# Patient Record
Sex: FEMALE | Race: WHITE | NOT HISPANIC OR LATINO | Employment: OTHER | URBAN - METROPOLITAN AREA
[De-identification: names, ages, dates, MRNs, and addresses within clinical notes are randomized per-mention and may not be internally consistent; named-entity substitution may affect disease eponyms.]

---

## 2017-01-20 ENCOUNTER — OFFICE VISIT (OUTPATIENT)
Dept: FAMILY MEDICINE CLINIC | Facility: CLINIC | Age: 82
End: 2017-01-20

## 2017-01-20 VITALS
TEMPERATURE: 98.2 F | HEIGHT: 64 IN | SYSTOLIC BLOOD PRESSURE: 120 MMHG | BODY MASS INDEX: 19.5 KG/M2 | DIASTOLIC BLOOD PRESSURE: 52 MMHG | HEART RATE: 68 BPM | OXYGEN SATURATION: 95 % | WEIGHT: 114.2 LBS

## 2017-01-20 DIAGNOSIS — I50.9 CONGESTIVE HEART FAILURE, UNSPECIFIED CONGESTIVE HEART FAILURE CHRONICITY, UNSPECIFIED CONGESTIVE HEART FAILURE TYPE: ICD-10-CM

## 2017-01-20 DIAGNOSIS — E03.8 OTHER SPECIFIED HYPOTHYROIDISM: ICD-10-CM

## 2017-01-20 DIAGNOSIS — E78.49 OTHER HYPERLIPIDEMIA: ICD-10-CM

## 2017-01-20 DIAGNOSIS — I10 HYPERTENSION, ESSENTIAL: Primary | ICD-10-CM

## 2017-01-20 LAB
ALBUMIN SERPL-MCNC: 3.3 G/DL (ref 3.5–5.2)
ALBUMIN/GLOB SERPL: 0.8 G/DL
ALP SERPL-CCNC: 48 U/L (ref 39–117)
ALT SERPL W P-5'-P-CCNC: 17 U/L (ref 1–33)
ANION GAP SERPL CALCULATED.3IONS-SCNC: 12 MMOL/L
AST SERPL-CCNC: 19 U/L (ref 1–32)
BILIRUB SERPL-MCNC: 0.4 MG/DL (ref 0.1–1.2)
BUN BLD-MCNC: 33 MG/DL (ref 8–23)
BUN/CREAT SERPL: 19.5 (ref 7–25)
CALCIUM SPEC-SCNC: 9.1 MG/DL (ref 8.2–9.6)
CHLORIDE SERPL-SCNC: 99 MMOL/L (ref 98–107)
CHOLEST SERPL-MCNC: 100 MG/DL (ref 0–200)
CO2 SERPL-SCNC: 26 MMOL/L (ref 22–29)
CREAT BLD-MCNC: 1.69 MG/DL (ref 0.57–1)
GFR SERPL CREATININE-BSD FRML MDRD: 28 ML/MIN/1.73
GLOBULIN UR ELPH-MCNC: 3.9 GM/DL
GLUCOSE BLD-MCNC: 92 MG/DL (ref 65–99)
HDLC SERPL-MCNC: 44 MG/DL (ref 40–60)
LDLC SERPL CALC-MCNC: 44 MG/DL (ref 0–100)
LDLC/HDLC SERPL: 1 {RATIO}
POTASSIUM BLD-SCNC: 5.1 MMOL/L (ref 3.5–5.2)
PROT SERPL-MCNC: 7.2 G/DL (ref 6–8.5)
SODIUM BLD-SCNC: 137 MMOL/L (ref 136–145)
TRIGL SERPL-MCNC: 61 MG/DL (ref 0–150)
TSH SERPL DL<=0.05 MIU/L-ACNC: 0.37 MIU/ML (ref 0.27–4.2)
VLDLC SERPL-MCNC: 12.2 MG/DL (ref 5–40)

## 2017-01-20 PROCEDURE — 99214 OFFICE O/P EST MOD 30 MIN: CPT | Performed by: INTERNAL MEDICINE

## 2017-01-20 PROCEDURE — 84443 ASSAY THYROID STIM HORMONE: CPT | Performed by: INTERNAL MEDICINE

## 2017-01-20 PROCEDURE — 80061 LIPID PANEL: CPT | Performed by: INTERNAL MEDICINE

## 2017-01-20 PROCEDURE — 80053 COMPREHEN METABOLIC PANEL: CPT | Performed by: INTERNAL MEDICINE

## 2017-01-20 RX ORDER — LORATADINE 10 MG/1
10 TABLET ORAL EVERY 24 HOURS
COMMUNITY
End: 2017-04-17 | Stop reason: DRUGHIGH

## 2017-01-20 RX ORDER — AMIODARONE HYDROCHLORIDE 200 MG/1
200 TABLET ORAL
Status: ON HOLD | COMMUNITY
Start: 2016-12-24 | End: 2018-01-01

## 2017-01-20 RX ORDER — SPIRONOLACTONE 25 MG/1
25 TABLET ORAL
Status: ON HOLD | COMMUNITY
Start: 2017-01-16 | End: 2018-01-01

## 2017-01-20 RX ORDER — PSEUDOEPHEDRINE HCL 30 MG
100 TABLET ORAL
Status: ON HOLD | COMMUNITY
Start: 2014-01-06 | End: 2018-01-01

## 2017-01-20 RX ORDER — DIPHENHYDRAMINE HCL 25 MG
25 CAPSULE ORAL EVERY 6 HOURS PRN
Status: ON HOLD | COMMUNITY
End: 2018-01-01

## 2017-01-20 RX ORDER — FUROSEMIDE 20 MG/1
20 TABLET ORAL
Status: ON HOLD | COMMUNITY
Start: 2016-12-24 | End: 2018-01-01

## 2017-01-20 NOTE — PROGRESS NOTES
Subjective   [place] MICHELLE Robison is a 89 y.o. female.   Follow up on high blood pressure, lipids thyroid recent CHF diagnosis  History of Present Illness   She is doing fairly well breathing is better recently new onset of CHF is been heard in this medical problem.  Regardless the this felt be a related to her chemotherapy.  Denies any significant leg swelling now breathing is better with onset of treatment.  She is on amiodarone was already hypothyroid prior to starting that we'll check those labs today.  Blood pressure cholesterol meds and doing fairly well to daughter's knowledge.  Patient is currently on Eliquis    Review of Systems   Cardiovascular:        CHF new status post hospitalization   All other systems reviewed and are negative.      Objective   Vitals:    01/20/17 1328   BP: 120/52   Pulse: 68   Temp: 98.2 °F (36.8 °C)   SpO2: 95%   Weight: 114 lb 3.2 oz (51.8 kg)     Physical Exam   Constitutional:   Thin, overall alert diminished hearing particularly right ear   Cardiovascular: Normal rate, regular rhythm and normal heart sounds.    Pulmonary/Chest: Effort normal and breath sounds normal.   Abdominal: Soft. Bowel sounds are normal.   Nursing note and vitals reviewed.      No results found for: INR    Procedures    Assessment/Plan   1.  Hypertension controlled plan continue present medicine recheck in one years time    2.  Hyperlipidemia plan await lab recheck in 6 months if labwork satisfactory    3.  Hypothyroidism plan await lab we did discuss with patient and daughter the need for monitoring twice year with amiodarone therapy    4.  CHF clinically controlled at this point in time plan continue present treatment follow-up with us as needed and per patient's cardiologist    DMuch of this encounter note is an electronic transcription/translation of spoken language to printed text.  The electronic translation of spoken language may permit erroneous, or at times, nonsensical words or phrases to be  inadvertently transcribed.  Although I have reviewed the note for such errors, some may still exist.

## 2017-01-20 NOTE — MR AVS SNAPSHOT
Kathryn Robison   1/20/2017 1:20 PM   Office Visit    Dept Phone:  [phone]   Encounter #:  [id number]    Provider:  Jesus Velázquez Jr., MD   Department:  [facility]                Your Full Care Plan              Your Updated Medication List          This list is accurate as of: 1/20/17  2:14 PM.  Always use your most recent med list.                amiodarone 200 MG tablet   Commonly known as:  PACERONE       apixaban 2.5 MG tablet tablet   Commonly known as:  ELIQUIS       BENADRYL 25 mg   Generic drug:  diphenhydrAMINE        MG capsule       furosemide 20 MG tablet   Commonly known as:  LASIX       levothyroxine 112 MCG tablet   Commonly known as:  SYNTHROID   Take 1 tablet by mouth Daily.       loratadine 10 MG tablet   Commonly known as:  CLARITIN       losartan 100 MG tablet   Commonly known as:  COZAAR   Take 1 tablet by mouth daily.       nebivolol 20 MG tablet   Commonly known as:  BYSTOLIC   Take 1 tablet by mouth daily.       OSCAL 500/200 D-3 500-200 MG-UNIT per tablet   Generic drug:  calcium-vitamin D       rosuvastatin 5 MG tablet   Commonly known as:  CRESTOR   Take 1 tablet by mouth daily.       sertraline 25 MG tablet   Commonly known as:  ZOLOFT   TAKE 1 TABLET DAILY       spironolactone 25 MG tablet   Commonly known as:  ALDACTONE               We Performed the Following     Comprehensive Metabolic Panel     Lipid Panel     TSH       You Were Diagnosed With        Codes Comments    Hypertension, essential    -  Primary ICD-10-CM: I10  ICD-9-CM: 401.9     Other hyperlipidemia     ICD-10-CM: E78.4  ICD-9-CM: 272.4     Other specified hypothyroidism     ICD-10-CM: E03.8  ICD-9-CM: 244.8     Congestive heart failure, unspecified congestive heart failure chronicity, unspecified congestive heart failure type     ICD-10-CM: I50.9  ICD-9-CM: 428.0       Instructions     None    Patient Instructions History      Upcoming  "Appointments     Visit Type Date Time Department    OFFICE VISIT 2017  1:20 PM [facility]    LAB 2017 11:00 AM K Glad to Have Yout Signup     [facility] [employer] allows you to send messages to your doctor, view your test results, renew your prescriptions, schedule appointments, and more. To sign up, go to [employer] and click on the Sign Up Now link in the New User? box. Enter your [employer] Activation Code exactly as it appears below along with the last four digits of your Social Security Number and your Date of Birth () to complete the sign-up process. If you do not sign up before the expiration date, you must request a new code.    [employer] Activation Code: [email or web address]  Expires: 2/3/2017  2:14 PM    If you have questions, you can email [email or web address] or call [phone] to talk to our [employer] staff. Remember, [employer] is NOT to be used for urgent needs. For medical emergencies, dial 911.               Other Info from Your Visit           Your Appointments     2017 11:00 AM EST   Lab with LAB [facility]   [facility] (--)    [address] [place] [id number] [phone]              Allergies     Codeine      Penicillins      Sulfa Antibiotics        Reason for Visit     Follow-up hospital      Vital Signs     Blood Pressure Pulse Temperature Height Weight Oxygen Saturation    120/52 (BP Location: Right arm, Patient Position: Sitting, Cuff Size: Adult) 68 98.2 °F (36.8 °C) (Oral) 64\" (162.6 cm) 114 lb 3.2 oz (51.8 kg) 95%    Body Mass Index Smoking Status                19.6 kg/m2 Former Smoker          Problems and Diagnoses Noted     High blood pressure    -  Primary    Other hyperlipidemia        Underactive thyroid        Heart failure          Results         "

## 2017-01-23 RX ORDER — LEVOTHYROXINE SODIUM 112 UG/1
112 TABLET ORAL DAILY
Qty: 90 TABLET | Refills: 1 | Status: SHIPPED | OUTPATIENT
Start: 2017-01-23 | End: 2017-03-16

## 2017-03-16 DIAGNOSIS — E03.9 HYPOTHYROIDISM, UNSPECIFIED TYPE: Primary | ICD-10-CM

## 2017-03-16 RX ORDER — LEVOTHYROXINE SODIUM 0.1 MG/1
100 TABLET ORAL DAILY
Qty: 30 TABLET | Refills: 1 | Status: SHIPPED | OUTPATIENT
Start: 2017-03-16 | End: 2017-05-08 | Stop reason: SDUPTHER

## 2017-03-20 ENCOUNTER — TELEPHONE (OUTPATIENT)
Dept: FAMILY MEDICINE CLINIC | Facility: CLINIC | Age: 82
End: 2017-03-20

## 2017-03-20 ENCOUNTER — OFFICE VISIT (OUTPATIENT)
Dept: FAMILY MEDICINE CLINIC | Facility: CLINIC | Age: 82
End: 2017-03-20

## 2017-03-20 VITALS
SYSTOLIC BLOOD PRESSURE: 110 MMHG | HEART RATE: 49 BPM | HEIGHT: 64 IN | DIASTOLIC BLOOD PRESSURE: 58 MMHG | OXYGEN SATURATION: 98 % | BODY MASS INDEX: 18.44 KG/M2 | WEIGHT: 108 LBS | TEMPERATURE: 97.7 F

## 2017-03-20 DIAGNOSIS — N18.9 CKD (CHRONIC KIDNEY DISEASE), UNSPECIFIED STAGE: ICD-10-CM

## 2017-03-20 DIAGNOSIS — R00.1 BRADYCARDIA: ICD-10-CM

## 2017-03-20 DIAGNOSIS — N30.01 ACUTE CYSTITIS WITH HEMATURIA: Primary | ICD-10-CM

## 2017-03-20 DIAGNOSIS — I10 ESSENTIAL HYPERTENSION: ICD-10-CM

## 2017-03-20 LAB
BACTERIA UR QL AUTO: ABNORMAL /HPF
BILIRUB UR QL STRIP: NEGATIVE
CLARITY UR: CLEAR
COLOR UR: YELLOW
GLUCOSE UR STRIP-MCNC: NEGATIVE MG/DL
HGB UR QL STRIP.AUTO: ABNORMAL
KETONES UR QL STRIP: NEGATIVE
LEUKOCYTE ESTERASE UR QL STRIP.AUTO: ABNORMAL
NITRITE UR QL STRIP: NEGATIVE
PH UR STRIP.AUTO: 6.5 [PH] (ref 4.6–8)
PROT UR QL STRIP: NEGATIVE
RBC # UR: ABNORMAL /HPF
REF LAB TEST METHOD: ABNORMAL
SP GR UR STRIP: 1.01 (ref 1–1.03)
SQUAMOUS #/AREA URNS HPF: ABNORMAL /HPF
UROBILINOGEN UR QL STRIP: ABNORMAL
WBC UR QL AUTO: ABNORMAL /HPF

## 2017-03-20 PROCEDURE — 81001 URINALYSIS AUTO W/SCOPE: CPT | Performed by: NURSE PRACTITIONER

## 2017-03-20 PROCEDURE — 87086 URINE CULTURE/COLONY COUNT: CPT | Performed by: NURSE PRACTITIONER

## 2017-03-20 PROCEDURE — 99213 OFFICE O/P EST LOW 20 MIN: CPT | Performed by: NURSE PRACTITIONER

## 2017-03-20 NOTE — PATIENT INSTRUCTIONS
pending urine culture, cont increase H20 6-8 glasses daily and wipe front to back, urin every couple hours, call or RTC if sx worsen, will FU with Dr Washington regarding bradycardia, cont check BP at home

## 2017-03-20 NOTE — PROGRESS NOTES
Subjective   Kathryn MICHELLE Robison is a 89 y.o. female.     History of Present Illness   C/o dysuria x 5 days now slightly improved, increased H20 and thinks has helped, notes was not drinking much H20 prev, wiping front to back, no fevers, no flank pain, no body aches, no urgency or freq other than normal as on lasix, recently saw Dr Washington cardiology 02/17 with HR 51, today 49 denies syncope or falls at home, no dizziness HA LE edema or CP, Sees oncology Dr Henriquez for lung and ovarian ca, s/p hyst in 1980s but some ovary left, with last cr 1.69, allergic codeine, PCN, sulfa    The following portions of the patient's history were reviewed and updated as appropriate: allergies, current medications, past family history, past medical history, past social history, past surgical history and problem list.    Review of Systems   Constitutional: Negative for fever.   Respiratory: Negative for cough, shortness of breath and wheezing.    Cardiovascular: Negative for chest pain, palpitations and leg swelling.   Genitourinary: Positive for dysuria, frequency and urgency. Negative for decreased urine volume, difficulty urinating, flank pain, genital sores, hematuria, menstrual problem, pelvic pain, vaginal bleeding, vaginal discharge and vaginal pain.   Neurological: Negative for dizziness and headaches.   All other systems reviewed and are negative.      Objective   Physical Exam   Constitutional: She is oriented to person, place, and time. She appears well-developed and well-nourished.   HENT:   Head: Normocephalic and atraumatic.   Eyes: Conjunctivae and EOM are normal. Pupils are equal, round, and reactive to light.   Cardiovascular: Normal rate, regular rhythm and normal heart sounds.    Pulmonary/Chest: Effort normal and breath sounds normal.   Abdominal: There is no tenderness. There is no CVA tenderness.   Musculoskeletal: Normal range of motion.   Neurological: She is alert and oriented to person, place, and time.   Skin:  Skin is warm and dry.   Psychiatric: She has a normal mood and affect. Her behavior is normal. Judgment and thought content normal.   Vitals reviewed.      Assessment/Plan   Kathryn was seen today for urinary tract infection.    Diagnoses and all orders for this visit:    Acute cystitis with hematuria  -     Urinalysis With Microscopic  -     Urinalysis  -     Urinalysis, Microscopic Only  -     Urine Culture    CKD (chronic kidney disease), unspecified stage    Bradycardia    Essential hypertension    pending urine culture, cont increase H20 6-8 glasses daily and wipe front to back, urin every couple hours, call or RTC if sx worsen, will FU with Dr Washington regarding bradycardia, cont check BP at home

## 2017-03-20 NOTE — TELEPHONE ENCOUNTER
PT HAS APPT WITH JAMAR MEJIA TODAY    ----- Message from Shobha Posadas sent at 3/20/2017  9:39 AM EDT -----  Contact: PATIENT [phone]  PATIENT HAS A UTI AND WANTS TO BE SEEN TODAY

## 2017-03-22 ENCOUNTER — TELEPHONE (OUTPATIENT)
Dept: FAMILY MEDICINE CLINIC | Facility: CLINIC | Age: 82
End: 2017-03-22

## 2017-03-22 LAB — BACTERIA SPEC AEROBE CULT: ABNORMAL

## 2017-03-22 NOTE — TELEPHONE ENCOUNTER
Informed pts daughter Zabrina of results    ----- Message from KIET Villalta sent at 3/22/2017  8:57 AM EDT -----  Culture shows very little bacteria should clear on own and doesn't require abx unless she is still having any sx

## 2017-03-23 RX ORDER — LOSARTAN POTASSIUM 25 MG/1
25 TABLET ORAL DAILY
Qty: 90 TABLET | Refills: 3 | Status: ON HOLD | OUTPATIENT
Start: 2017-03-23 | End: 2018-01-01

## 2017-03-23 RX ORDER — ROSUVASTATIN CALCIUM 5 MG/1
5 TABLET, COATED ORAL DAILY
Qty: 90 TABLET | Refills: 3 | Status: ON HOLD | OUTPATIENT
Start: 2017-03-23 | End: 2018-01-01

## 2017-04-17 ENCOUNTER — OFFICE VISIT (OUTPATIENT)
Dept: FAMILY MEDICINE CLINIC | Facility: CLINIC | Age: 82
End: 2017-04-17

## 2017-04-17 VITALS
TEMPERATURE: 98.3 F | OXYGEN SATURATION: 94 % | HEART RATE: 52 BPM | HEIGHT: 63 IN | BODY MASS INDEX: 18.96 KG/M2 | SYSTOLIC BLOOD PRESSURE: 126 MMHG | DIASTOLIC BLOOD PRESSURE: 60 MMHG | WEIGHT: 107 LBS

## 2017-04-17 DIAGNOSIS — J30.2 SEASONAL ALLERGIC RHINITIS, UNSPECIFIED ALLERGIC RHINITIS TRIGGER: ICD-10-CM

## 2017-04-17 DIAGNOSIS — E03.9 HYPOTHYROIDISM, UNSPECIFIED TYPE: ICD-10-CM

## 2017-04-17 DIAGNOSIS — D22.9 CHANGING NEVUS: ICD-10-CM

## 2017-04-17 DIAGNOSIS — J01.00 ACUTE MAXILLARY SINUSITIS, RECURRENCE NOT SPECIFIED: Primary | ICD-10-CM

## 2017-04-17 DIAGNOSIS — R05.9 COUGH: ICD-10-CM

## 2017-04-17 LAB — TSH SERPL DL<=0.05 MIU/L-ACNC: 0.34 MIU/ML (ref 0.27–4.2)

## 2017-04-17 PROCEDURE — 99213 OFFICE O/P EST LOW 20 MIN: CPT | Performed by: NURSE PRACTITIONER

## 2017-04-17 PROCEDURE — 84443 ASSAY THYROID STIM HORMONE: CPT | Performed by: INTERNAL MEDICINE

## 2017-04-17 RX ORDER — AZITHROMYCIN 250 MG/1
TABLET, FILM COATED ORAL
Qty: 6 TABLET | Refills: 0 | Status: SHIPPED | OUTPATIENT
Start: 2017-04-17 | End: 2017-04-28

## 2017-04-17 RX ORDER — CETIRIZINE HYDROCHLORIDE 10 MG/1
5 TABLET ORAL NIGHTLY
Qty: 30 TABLET | Refills: 5 | Status: ON HOLD | OUTPATIENT
Start: 2017-04-17 | End: 2018-01-01

## 2017-04-17 NOTE — PATIENT INSTRUCTIONS
erx zpack use as directed, increase H20 and rest, monitor sx and if persist or worsen RTC, stop claritin and trial zytrec 10 mg 1/2 PO HS, cont OTC cough drops, check labs and call with results, enc FU with Dr Brayden phillip overdue

## 2017-04-17 NOTE — PROGRESS NOTES
Subjective   [place] MICHELLE Robison is a 89 y.o. female.     History of Present Illness   C/o prod cough x 5 days,  No SOA or wheezing, no fevers, no sore throat, some sinus tenderness, no ear pain, no known exposure, no OTC, with allergies on loratadine 10 mg daily doesn't think is helping, allergic codeine, PCN and sulfa, no recent abx  Pt here with eldest daughter Yumiko, pt not good historian, c/o fatigue, also daughter notes hx of skin ca and spot on nose x 2, thinks pt previously saw Dr Owen  With hypothyroid on levothyroxine 100 mcg daily no missed doses    The following portions of the patient's history were reviewed and updated as appropriate: allergies, current medications, past family history, past medical history, past social history, past surgical history and problem list.    Review of Systems   Constitutional: Positive for fatigue. Negative for fever.   HENT: Positive for congestion, facial swelling, hearing loss and sinus pressure. Negative for dental problem, ear discharge, ear pain, mouth sores, nosebleeds, postnasal drip, sore throat, trouble swallowing and voice change.    Respiratory: Positive for cough. Negative for shortness of breath and wheezing.    Cardiovascular: Negative for chest pain, palpitations and leg swelling.   Skin: Positive for rash.   Neurological: Negative for dizziness and headaches.   All other systems reviewed and are negative.      Objective   Physical Exam   Constitutional: She is oriented to person, place, and time. She appears well-developed and well-nourished.   HENT:   Head: Normocephalic and atraumatic.   Right Ear: Tympanic membrane normal. Decreased hearing is noted.   Left Ear: Tympanic membrane normal. Decreased hearing is noted.   Nose: Mucosal edema present. Right sinus exhibits maxillary sinus tenderness. Right sinus exhibits no frontal sinus tenderness. Left sinus exhibits maxillary sinus tenderness. Left sinus exhibits no frontal sinus tenderness.   Mouth/Throat:  No oropharyngeal exudate, posterior oropharyngeal edema or posterior oropharyngeal erythema.   Eyes: Conjunctivae and EOM are normal. Pupils are equal, round, and reactive to light.   Neck: Normal range of motion. Neck supple. No thyromegaly present.   Cardiovascular: Normal rate, regular rhythm and normal heart sounds.    Pulmonary/Chest: Effort normal and breath sounds normal.   Musculoskeletal: Normal range of motion.   Lymphadenopathy:     She has no cervical adenopathy.   Neurological: She is alert and oriented to person, place, and time.   Skin: Skin is warm and dry. Rash (nonhealing ulcer on nose, no excoriation, no satellite lesions) noted.   Psychiatric: She has a normal mood and affect. Her behavior is normal. Judgment and thought content normal.   Vitals reviewed.      Assessment/Plan   Kathryn was seen today for cough.    Diagnoses and all orders for this visit:    Acute maxillary sinusitis, recurrence not specified    Cough    Seasonal allergic rhinitis, unspecified allergic rhinitis trigger    Changing nevus    Hypothyroidism, unspecified type  -     TSH  -     Cancel: TSH    Other orders  -     cetirizine (zyrTEC) 10 MG tablet; Take 0.5 tablets by mouth Every Night.  -     azithromycin (ZITHROMAX Z-DENVER) 250 MG tablet; Take 2 tablets the first day, then 1 tablet daily for 4 days.    erx zpack use as directed, increase H20 and rest, monitor sx and if persist or worsen RTC, stop claritin and trial zytrec 10 mg 1/2 PO HS, cont OTC cough drops, check labs and call with results, enc FU with Dr Brayden prado

## 2017-04-18 ENCOUNTER — TELEPHONE (OUTPATIENT)
Dept: FAMILY MEDICINE CLINIC | Facility: CLINIC | Age: 82
End: 2017-04-18

## 2017-04-18 NOTE — TELEPHONE ENCOUNTER
----- Message from KIET Villalta sent at 4/18/2017  7:56 AM EDT -----  Thyroid lab normal    Pt's daughter informed of lab results

## 2017-04-24 ENCOUNTER — TELEPHONE (OUTPATIENT)
Dept: FAMILY MEDICINE CLINIC | Facility: CLINIC | Age: 82
End: 2017-04-24

## 2017-04-24 ENCOUNTER — OFFICE VISIT (OUTPATIENT)
Dept: FAMILY MEDICINE CLINIC | Facility: CLINIC | Age: 82
End: 2017-04-24

## 2017-04-24 VITALS
BODY MASS INDEX: 18.25 KG/M2 | SYSTOLIC BLOOD PRESSURE: 112 MMHG | HEART RATE: 50 BPM | WEIGHT: 103 LBS | HEIGHT: 63 IN | OXYGEN SATURATION: 99 % | TEMPERATURE: 97.4 F | DIASTOLIC BLOOD PRESSURE: 62 MMHG

## 2017-04-24 DIAGNOSIS — R53.1 WEAKNESS: ICD-10-CM
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
## 2017-04-24 DIAGNOSIS — C56.1 OVARIAN CANCER, RIGHT (HCC): ICD-10-CM

## 2017-04-24 DIAGNOSIS — C34.12 MALIGNANT NEOPLASM OF UPPER LOBE OF LEFT LUNG (HCC): ICD-10-CM

## 2017-04-24 DIAGNOSIS — R63.4 WEIGHT LOSS: ICD-10-CM

## 2017-04-24 DIAGNOSIS — R05.9 COUGH: Primary | ICD-10-CM

## 2017-04-24 LAB
ALBUMIN SERPL-MCNC: 3.8 G/DL (ref 3.5–5.2)
ALBUMIN/GLOB SERPL: 0.9 G/DL
ALP SERPL-CCNC: 54 U/L (ref 39–117)
ALT SERPL W P-5'-P-CCNC: 32 U/L (ref 1–33)
ANION GAP SERPL CALCULATED.3IONS-SCNC: 16.1 MMOL/L
AST SERPL-CCNC: 32 U/L (ref 1–32)
BILIRUB SERPL-MCNC: 0.4 MG/DL (ref 0.1–1.2)
BUN BLD-MCNC: 40 MG/DL (ref 8–23)
BUN/CREAT SERPL: 17.5 (ref 7–25)
CALCIUM SPEC-SCNC: 9.4 MG/DL (ref 8.2–9.6)
CHLORIDE SERPL-SCNC: 97 MMOL/L (ref 98–107)
CO2 SERPL-SCNC: 23.9 MMOL/L (ref 22–29)
CREAT BLD-MCNC: 2.28 MG/DL (ref 0.57–1)
ERYTHROCYTE [DISTWIDTH] IN BLOOD BY AUTOMATED COUNT: 14.4 % (ref 4.5–15)
GFR SERPL CREATININE-BSD FRML MDRD: 20 ML/MIN/1.73
GLOBULIN UR ELPH-MCNC: 4.2 GM/DL
GLUCOSE BLD-MCNC: 104 MG/DL (ref 65–99)
HCT VFR BLD AUTO: 32.7 % (ref 31–42)
HGB BLD-MCNC: 10.5 G/DL (ref 12–18)
LYMPHOCYTES # BLD AUTO: 2 10*3/MM3 (ref 1.2–3.4)
LYMPHOCYTES NFR BLD AUTO: 23.8 % (ref 21–51)
MCH RBC QN AUTO: 30.5 PG (ref 26.1–33.1)
MCHC RBC AUTO-ENTMCNC: 32.1 G/DL (ref 33–37)
MCV RBC AUTO: 95 FL (ref 80–99)
MONOCYTES # BLD AUTO: 0.4 10*3/MM3 (ref 0.1–0.6)
MONOCYTES NFR BLD AUTO: 4.3 % (ref 2–9)
NEUTROPHILS # BLD AUTO: 6.2 10*3/MM3 (ref 1.4–6.5)
NEUTROPHILS NFR BLD AUTO: 71.9 % (ref 42–75)
PLATELET # BLD AUTO: 222 10*3/MM3 (ref 150–450)
PMV BLD AUTO: 6.2 FL (ref 7.1–10.5)
POTASSIUM BLD-SCNC: 4.5 MMOL/L (ref 3.5–5.2)
PROT SERPL-MCNC: 8 G/DL (ref 6–8.5)
RBC # BLD AUTO: 3.44 10*6/MM3 (ref 4–6)
SODIUM BLD-SCNC: 137 MMOL/L (ref 136–145)
WBC NRBC COR # BLD: 8.6 10*3/MM3 (ref 4.5–10)

## 2017-04-24 PROCEDURE — 85025 COMPLETE CBC W/AUTO DIFF WBC: CPT | Performed by: NURSE PRACTITIONER

## 2017-04-24 PROCEDURE — 99213 OFFICE O/P EST LOW 20 MIN: CPT | Performed by: NURSE PRACTITIONER

## 2017-04-24 PROCEDURE — 80053 COMPREHEN METABOLIC PANEL: CPT | Performed by: NURSE PRACTITIONER

## 2017-04-24 NOTE — PATIENT INSTRUCTIONS
CBC shows cont anemia improving, call with results, enc start OTC ensure daily to help with calories, offer PT to help with strengthening pt defer, here today with daughter Yumiko who will contact Dr Henriquez pending apt next mo

## 2017-04-24 NOTE — PROGRESS NOTES
Subjective   [place] MICHELLE Robison is a 89 y.o. female.     History of Present Illness   Here to FU on cough was tx 04/17/17 zpack and states still with chronic cough now prod clear, still taking zyrtec, feels better but still with cough and now with worsening weakness with walking, lives at home alone with 3 daughters who check on her and help prepare meals, seeing Dr Henriquez for R ovarian ca and RYAN lung ca stable on last CXR and CT abd pelvis and chest, pending FU with oncology next mo with progressive elevation of  since 01/17, admits not eating much and poor appetite, has prev seen PT s/p MI and knows exercises but not doing at home, denies dizziness but fell this AM d/t leg weakness and tired, states has fallen at home last 2 days but has not hurt herself, now having to crawl to go around house to prevent falls, last labs [facility] 04/17 CBC hgb 10.3, cr 1.9    The following portions of the patient's history were reviewed and updated as appropriate: allergies, current medications, past family history, past medical history, past social history, past surgical history and problem list.    Review of Systems   Constitutional: Positive for fatigue. Negative for fever.   HENT: Positive for hearing loss. Negative for congestion, drooling, ear discharge, facial swelling, postnasal drip, rhinorrhea, sinus pressure, sore throat and voice change.    Respiratory: Positive for cough. Negative for shortness of breath and wheezing.    Cardiovascular: Negative for chest pain, palpitations and leg swelling.   Musculoskeletal: Negative for arthralgias, back pain, gait problem and joint swelling.   Neurological: Positive for weakness. Negative for dizziness, tremors, seizures, syncope, facial asymmetry, speech difficulty, light-headedness, numbness and headaches.   All other systems reviewed and are negative.      Objective   Physical Exam   Constitutional: She is oriented to person, place, and time. She appears well-developed and  well-nourished.   HENT:   Head: Normocephalic and atraumatic.   Right Ear: Hearing and tympanic membrane normal.   Left Ear: Hearing and tympanic membrane normal.   Nose: Right sinus exhibits no maxillary sinus tenderness and no frontal sinus tenderness. Left sinus exhibits no maxillary sinus tenderness and no frontal sinus tenderness.   Mouth/Throat: No oropharyngeal exudate, posterior oropharyngeal edema or posterior oropharyngeal erythema.   Eyes: Conjunctivae and EOM are normal. Pupils are equal, round, and reactive to light.   Neck: Normal range of motion. Neck supple. No thyromegaly present.   Cardiovascular: Normal rate, regular rhythm and normal heart sounds.    Pulmonary/Chest: Effort normal and breath sounds normal.   Musculoskeletal: Normal range of motion.   Lymphadenopathy:     She has no cervical adenopathy.   Neurological: She is alert and oriented to person, place, and time.   Skin: Skin is warm and dry.   Psychiatric: She has a normal mood and affect. Her behavior is normal. Judgment and thought content normal.   Vitals reviewed.      Assessment/Plan   Kathryn was seen today for follow-up and fall.    Diagnoses and all orders for this visit:    Cough  -     CBC & Differential  -     Comprehensive Metabolic Panel  -     CBC Auto Differential    Weakness  -     CBC & Differential  -     Comprehensive Metabolic Panel  -     CBC Auto Differential    Weight loss  -     CBC & Differential  -     Comprehensive Metabolic Panel  -     CBC Auto Differential    Ovarian cancer, right    Malignant neoplasm of upper lobe of left lung    Fall, initial encounter    CBC shows cont anemia improving, call with results, enc start OTC ensure daily to help with calories, offer PT to help with strengthening pt defer, here today with daughter Yumiko who will contact Dr Henriquez pending apt next mo

## 2017-04-24 NOTE — TELEPHONE ENCOUNTER
Called Zabrina, daughter 5:30 PM, Cr elevated from 1.9 now 2.28, showed labs to Dr Velázquez who agrees worried about DANIKA on CKD and recommend IV fluids tonight. Recommend go to ER tonight for eval. Zabrina verbalized understanding and will take her to [facility] tonight

## 2017-04-25 ENCOUNTER — TELEPHONE (OUTPATIENT)
Dept: FAMILY MEDICINE CLINIC | Facility: CLINIC | Age: 82
End: 2017-04-25

## 2017-04-25 NOTE — TELEPHONE ENCOUNTER
Pt is doing ok. She didn't stay overnight. She has to drink plenty of fluids. She has to be seen in 3 days. Heart rate was in 40's. They got it up to 60. She has a uti, gave antibiotic. She has a follow up appt on fri with you

## 2017-04-25 NOTE — TELEPHONE ENCOUNTER
Please call pt's daughter Zabrina and see how patient is, if they went to [facility] last night and got IV fluids

## 2017-04-28 ENCOUNTER — OFFICE VISIT (OUTPATIENT)
Dept: FAMILY MEDICINE CLINIC | Facility: CLINIC | Age: 82
End: 2017-04-28

## 2017-04-28 VITALS
WEIGHT: 107 LBS | SYSTOLIC BLOOD PRESSURE: 130 MMHG | HEIGHT: 64 IN | OXYGEN SATURATION: 98 % | BODY MASS INDEX: 18.27 KG/M2 | DIASTOLIC BLOOD PRESSURE: 60 MMHG | TEMPERATURE: 97.5 F | HEART RATE: 48 BPM

## 2017-04-28 DIAGNOSIS — N30.01 ACUTE CYSTITIS WITH HEMATURIA: Primary | ICD-10-CM

## 2017-04-28 DIAGNOSIS — C56.1 OVARIAN CANCER, RIGHT (HCC): ICD-10-CM

## 2017-04-28 DIAGNOSIS — C34.32 MALIGNANT NEOPLASM OF LOWER LOBE OF LEFT LUNG (HCC): ICD-10-CM

## 2017-04-28 DIAGNOSIS — I50.32 CHRONIC DIASTOLIC HEART FAILURE (HCC): ICD-10-CM

## 2017-04-28 DIAGNOSIS — R79.89 ELEVATED SERUM CREATININE: ICD-10-CM

## 2017-04-28 PROCEDURE — 99213 OFFICE O/P EST LOW 20 MIN: CPT | Performed by: NURSE PRACTITIONER

## 2017-04-28 RX ORDER — CIPROFLOXACIN 500 MG/1
500 TABLET, FILM COATED ORAL
COMMUNITY
Start: 2017-04-25 | End: 2017-05-02

## 2017-04-28 RX ORDER — VITAMIN A ACETATE, BETA CAROTENE, ASCORBIC ACID, CHOLECALCIFEROL, .ALPHA.-TOCOPHEROL ACETATE, DL-, THIAMINE MONONITRATE, RIBOFLAVIN, NIACINAMIDE, PYRIDOXINE HYDROCHLORIDE, FOLIC ACID, CYANOCOBALAMIN, CALCIUM CARBONATE, FERROUS FUMARATE, ZINC OXIDE, CUPRIC OXIDE 3080; 12; 120; 400; 1; 1.84; 3; 20; 22; 920; 25; 200; 27; 10; 2 [IU]/1; UG/1; MG/1; [IU]/1; MG/1; MG/1; MG/1; MG/1; MG/1; [IU]/1; MG/1; MG/1; MG/1; MG/1; MG/1
1 TABLET, FILM COATED ORAL
COMMUNITY
Start: 2017-04-26 | End: 2017-05-10 | Stop reason: SDUPTHER

## 2017-04-28 NOTE — PATIENT INSTRUCTIONS
reviewed [facility] records, labs, imaging see above HPI, finish cipro 500 Bid take with meals increase H20 wipe front to back urin arianne, RTC next week recheck UA, enc schedule FU with cardiology for eval off spironolactone and lasix as don't want exacerbation of CHF, cont FU with Dr Henriquez oncology, stressed importance of meals for calorie intake to prevent weight loss

## 2017-04-28 NOTE — PROGRESS NOTES
Subjective   [place] MICHELLE Robison is a 89 y.o. female.     History of Present Illness   Here to FU on [facility] went 04/24/17 after OV here showing Cr 2.28 concerned about DANIKA on top of CKD and was instructed to go to hospital for IV fluids, admits not drinking or eating much, went to [facility] and stayed overnight had IV fluids and found to have UTI now on cipro 500 mg BID x 1 wk, no dysuria, flank pain, freq or urgency, went back the next day s/t nausea thinks r/t medication with CXR, CT abd pelvis, CT head shows left lung mass lung ca and R ovarian ca monitoring, enlarging sees Dr Henriquez oncology, with BNP elevated 2000s in ER sees [facility] cardiology Diane was told to stop spironolactone and lasix and has not restarted, no LE edema or abd edema, no SOA wheezing coughing, feeling better than last seen and notes trying to increase food and H20, now on carnation for calorie supplement, here with daughter Zabrina    The following portions of the patient's history were reviewed and updated as appropriate: allergies, current medications, past family history, past medical history, past social history, past surgical history and problem list.    Review of Systems   Constitutional: Negative for diaphoresis, fatigue and fever.   Respiratory: Negative for cough, shortness of breath and wheezing.    Cardiovascular: Negative for chest pain, palpitations and leg swelling.   Endocrine: Negative for polyuria.   Genitourinary: Negative for decreased urine volume, dysuria, flank pain, frequency, hematuria, pelvic pain, urgency, vaginal bleeding, vaginal discharge and vaginal pain.   Neurological: Negative for dizziness, weakness and headaches.   All other systems reviewed and are negative.      Objective   Physical Exam   Constitutional: She is oriented to person, place, and time. She appears well-developed and well-nourished.   HENT:   Head: Normocephalic and atraumatic.   Eyes: Conjunctivae and EOM are normal. Pupils are equal, round,  and reactive to light.   Cardiovascular: Normal rate, regular rhythm and normal heart sounds.    Pulmonary/Chest: Effort normal and breath sounds normal.   Abdominal: She exhibits no distension. There is no tenderness. There is no CVA tenderness.   Musculoskeletal: Normal range of motion. She exhibits no edema (B LE).   Neurological: She is alert and oriented to person, place, and time.   Skin: Skin is warm and dry.   Psychiatric: She has a normal mood and affect. Her behavior is normal. Judgment and thought content normal.   Vitals reviewed.      Assessment/Plan   Kathryn was seen today for follow-up.    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

## 2017-05-03 LAB
BACTERIA UR QL AUTO: NORMAL /HPF
BILIRUB UR QL STRIP: NEGATIVE
CLARITY UR: CLEAR
COLOR UR: YELLOW
GLUCOSE UR STRIP-MCNC: NEGATIVE MG/DL
HGB UR QL STRIP.AUTO: NEGATIVE
KETONES UR QL STRIP: NEGATIVE
LEUKOCYTE ESTERASE UR QL STRIP.AUTO: NEGATIVE
NITRITE UR QL STRIP: NEGATIVE
PH UR STRIP.AUTO: 5.5 [PH] (ref 4.6–8)
PROT UR QL STRIP: NEGATIVE
RBC # UR: NORMAL /HPF
REF LAB TEST METHOD: NORMAL
SP GR UR STRIP: <=1.005 (ref 1–1.03)
SQUAMOUS #/AREA URNS HPF: NORMAL /HPF
UROBILINOGEN UR QL STRIP: NORMAL
WBC UR QL AUTO: NORMAL /HPF

## 2017-05-03 PROCEDURE — 81001 URINALYSIS AUTO W/SCOPE: CPT | Performed by: NURSE PRACTITIONER

## 2017-05-08 RX ORDER — LEVOTHYROXINE SODIUM 100 MCG
100 TABLET ORAL DAILY
Qty: 90 TABLET | Refills: 1 | Status: SHIPPED | OUTPATIENT
Start: 2017-05-08 | End: 2017-05-12 | Stop reason: SDUPTHER

## 2017-05-10 RX ORDER — VITAMIN A ACETATE, BETA CAROTENE, ASCORBIC ACID, CHOLECALCIFEROL, .ALPHA.-TOCOPHEROL ACETATE, DL-, THIAMINE MONONITRATE, RIBOFLAVIN, NIACINAMIDE, PYRIDOXINE HYDROCHLORIDE, FOLIC ACID, CYANOCOBALAMIN, CALCIUM CARBONATE, FERROUS FUMARATE, ZINC OXIDE, CUPRIC OXIDE 3080; 12; 120; 400; 1; 1.84; 3; 20; 22; 920; 25; 200; 27; 10; 2 [IU]/1; UG/1; MG/1; [IU]/1; MG/1; MG/1; MG/1; MG/1; MG/1; [IU]/1; MG/1; MG/1; MG/1; MG/1; MG/1
1 TABLET, FILM COATED ORAL DAILY
Qty: 30 TABLET | Refills: 11 | Status: SHIPPED | OUTPATIENT
Start: 2017-05-10 | End: 2017-06-09

## 2017-05-12 RX ORDER — LEVOTHYROXINE SODIUM 0.1 MG/1
100 TABLET ORAL DAILY
Qty: 90 TABLET | Refills: 1 | Status: SHIPPED | OUTPATIENT
Start: 2017-05-12 | End: 2017-09-14 | Stop reason: DRUGHIGH

## 2017-05-25 RX ORDER — NEBIVOLOL HYDROCHLORIDE 20 MG/1
TABLET ORAL
Qty: 90 TABLET | Refills: 2 | Status: ON HOLD | OUTPATIENT
Start: 2017-05-25 | End: 2018-01-01

## 2017-08-16 ENCOUNTER — TELEPHONE (OUTPATIENT)
Dept: FAMILY MEDICINE CLINIC | Facility: CLINIC | Age: 82
End: 2017-08-16

## 2017-08-16 RX ORDER — PRENATAL WITH FERROUS FUM AND FOLIC ACID 3080; 920; 120; 400; 22; 1.84; 3; 20; 10; 1; 12; 200; 27; 25; 2 [IU]/1; [IU]/1; MG/1; [IU]/1; MG/1; MG/1; MG/1; MG/1; MG/1; MG/1; UG/1; MG/1; MG/1; MG/1; MG/1
1 TABLET ORAL DAILY
Qty: 30 TABLET | Refills: 0 | Status: ON HOLD | OUTPATIENT
Start: 2017-08-16 | End: 2018-01-01

## 2017-09-13 ENCOUNTER — TELEPHONE (OUTPATIENT)
Dept: FAMILY MEDICINE CLINIC | Facility: CLINIC | Age: 82
End: 2017-09-13

## 2017-09-13 DIAGNOSIS — E03.9 HYPOTHYROIDISM, UNSPECIFIED TYPE: Primary | ICD-10-CM

## 2017-09-14 DIAGNOSIS — E03.9 HYPOTHYROIDISM, UNSPECIFIED TYPE: Primary | ICD-10-CM

## 2017-09-14 RX ORDER — LEVOTHYROXINE SODIUM 112 UG/1
112 TABLET ORAL DAILY
Qty: 30 TABLET | Refills: 0
Start: 2017-09-14 | End: 2017-10-27 | Stop reason: DRUGHIGH

## 2017-10-17 ENCOUNTER — TELEPHONE (OUTPATIENT)
Dept: FAMILY MEDICINE CLINIC | Facility: CLINIC | Age: 82
End: 2017-10-17

## 2017-10-17 NOTE — TELEPHONE ENCOUNTER
[facility] SPECIALIST JUST WANTED TO MAKE SURE BTI WAS AWARE OF PT ELEVATED THYROID, WHEN SHE SPOKE TO THE PT ,THE PT ACTED LIKE WE KNEW ABOUT IT BUT SAID HER KIDNEY PROBLEM WAS MORE IMPORTANT AND THAT HER THYROID COULD BE PUT TO THE SIDE FOR NOW.

## 2017-10-26 ENCOUNTER — LAB (OUTPATIENT)
Dept: FAMILY MEDICINE CLINIC | Facility: CLINIC | Age: 82
End: 2017-10-26

## 2017-10-26 DIAGNOSIS — E03.9 HYPOTHYROIDISM, UNSPECIFIED TYPE: ICD-10-CM

## 2017-10-26 LAB — TSH SERPL DL<=0.05 MIU/L-ACNC: 16.77 MIU/ML (ref 0.27–4.2)

## 2017-10-26 PROCEDURE — 36415 COLL VENOUS BLD VENIPUNCTURE: CPT | Performed by: INTERNAL MEDICINE

## 2017-10-26 PROCEDURE — 84443 ASSAY THYROID STIM HORMONE: CPT | Performed by: INTERNAL MEDICINE

## 2017-10-27 DIAGNOSIS — E03.9 HYPOTHYROIDISM, UNSPECIFIED TYPE: Primary | ICD-10-CM

## 2017-10-27 RX ORDER — LEVOTHYROXINE SODIUM 0.12 MG/1
125 TABLET ORAL DAILY
Qty: 90 TABLET | Refills: 1 | Status: SHIPPED | OUTPATIENT
Start: 2017-10-27 | End: 2018-01-01 | Stop reason: SDUPTHER

## 2017-10-27 RX ORDER — LEVOTHYROXINE SODIUM 0.12 MG/1
125 TABLET ORAL DAILY
Qty: 30 TABLET | Refills: 2 | Status: SHIPPED | OUTPATIENT
Start: 2017-10-27 | End: 2017-10-27 | Stop reason: SDUPTHER

## 2017-10-27 RX ORDER — LEVOTHYROXINE SODIUM 0.12 MG/1
125 TABLET ORAL DAILY
Qty: 90 TABLET | Refills: 1 | Status: SHIPPED | OUTPATIENT
Start: 2017-10-27 | End: 2017-10-27 | Stop reason: SDUPTHER

## 2017-10-27 NOTE — TELEPHONE ENCOUNTER
Still needs to be addressed if [id number]  increaseto 125  Probably effect of amiodarone  tsh 6wk

## 2017-10-30 ENCOUNTER — TELEPHONE (OUTPATIENT)
Dept: FAMILY MEDICINE CLINIC | Facility: CLINIC | Age: 82
End: 2017-10-30

## 2017-10-30 NOTE — TELEPHONE ENCOUNTER
----- Message from Jesus Velázquez Jr., MD sent at 10/26/2017  9:24 PM EDT -----  If on thyroid 112  Increase to 125 w tsh 6wk  Make sure takes on empty stomach    Pt's daughter informed of lab results

## 2017-11-03 ENCOUNTER — TELEPHONE (OUTPATIENT)
Dept: FAMILY MEDICINE CLINIC | Facility: CLINIC | Age: 82
End: 2017-11-03

## 2017-11-10 ENCOUNTER — OFFICE VISIT (OUTPATIENT)
Dept: FAMILY MEDICINE CLINIC | Facility: CLINIC | Age: 82
End: 2017-11-10

## 2017-11-10 VITALS
HEIGHT: 64 IN | HEART RATE: 66 BPM | BODY MASS INDEX: 18.85 KG/M2 | DIASTOLIC BLOOD PRESSURE: 66 MMHG | OXYGEN SATURATION: 95 % | TEMPERATURE: 98 F | SYSTOLIC BLOOD PRESSURE: 118 MMHG | WEIGHT: 110.4 LBS

## 2017-11-10 DIAGNOSIS — Z48.02 VISIT FOR SUTURE REMOVAL: ICD-10-CM

## 2017-11-10 DIAGNOSIS — S01.01XD LACERATION OF SCALP, SUBSEQUENT ENCOUNTER: ICD-10-CM
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
## 2017-11-10 DIAGNOSIS — Z00.00 MEDICARE ANNUAL WELLNESS VISIT, INITIAL: Primary | ICD-10-CM

## 2017-11-10 DIAGNOSIS — S51.801D OPEN WOUND OF RIGHT FOREARM, SUBSEQUENT ENCOUNTER: ICD-10-CM

## 2017-11-10 PROBLEM — E78.5 HYPERLIPIDEMIA LDL GOAL <70: Status: ACTIVE | Noted: 2017-11-10

## 2017-11-10 PROBLEM — E03.9 HYPOTHYROIDISM: Status: ACTIVE | Noted: 2017-11-10

## 2017-11-10 PROBLEM — N13.30 HYDROURETERONEPHROSIS: Status: ACTIVE | Noted: 2017-10-13

## 2017-11-10 PROBLEM — I34.0 MITRAL REGURGITATION: Status: ACTIVE | Noted: 2017-11-10

## 2017-11-10 PROBLEM — I48.0 PAROXYSMAL ATRIAL FIBRILLATION (HCC): Status: ACTIVE | Noted: 2017-11-10

## 2017-11-10 PROBLEM — N82.0 VESICOVAGINAL FISTULA: Status: ACTIVE | Noted: 2017-10-13

## 2017-11-10 PROBLEM — C48.2 PERITONEAL CARCINOMA (HCC): Status: ACTIVE | Noted: 2017-09-20

## 2017-11-10 PROBLEM — Z86.718 HISTORY OF DEEP VEIN THROMBOSIS (DVT) OF LOWER EXTREMITY: Status: ACTIVE | Noted: 2017-11-10

## 2017-11-10 PROCEDURE — 99213 OFFICE O/P EST LOW 20 MIN: CPT | Performed by: NURSE PRACTITIONER

## 2017-11-10 PROCEDURE — G0438 PPPS, INITIAL VISIT: HCPCS | Performed by: NURSE PRACTITIONER

## 2017-11-10 NOTE — PROGRESS NOTES
QUICK REFERENCE INFORMATION:  The ABCs of the Annual Wellness Visit    Initial Medicare Wellness Visit    HEALTH RISK ASSESSMENT    11/14/1926    Recent Hospitalizations:  Recently treated at the following:  Other: [facility] 11/17 head injury, 10/17 UTI.    Current Medical Providers:  Patient Care Team:  Jesus Velázquez Jr., MD as PCP - General (Internal Medicine)  Prabhjot Henriquez MD as Consulting Physician (Hematology and Oncology)    Smoking Status:  History   Smoking Status   • Former Smoker   • Quit date: 1977   Smokeless Tobacco   • Not on file       Alcohol Consumption:  History   Alcohol Use No       Depression Screen:   PHQ-2/PHQ-9 Depression Screening 11/10/2017   Little interest or pleasure in doing things 0   Feeling down, depressed, or hopeless 0   Trouble falling or staying asleep, or sleeping too much 0   Feeling tired or having little energy 0   Poor appetite or overeating 1   Feeling bad about yourself - or that you are a failure or have let yourself or your family down 0   Trouble concentrating on things, such as reading the newspaper or watching television 0   Moving or speaking so slowly that other people could have noticed. Or the opposite - being so fidgety or restless that you have been moving around a lot more than usual 0   Thoughts that you would be better off dead, or of hurting yourself in some way 0   Total Score 1   If you checked off any problems, how difficult have these problems made it for you to do your work, take care of things at home, or get along with other people? Not difficult at all       Health Habits and Functional and Cognitive Screening:  Functional & Cognitive Status 11/10/2017   Do you have difficulty preparing food and eating? Yes   Do you have difficulty bathing yourself, getting dressed or grooming yourself? Yes   Do you have difficulty using the toilet? Yes   Do you have difficulty moving around from place to place? Yes   Do you have trouble with steps or getting  out of a bed or a chair? Yes   In the past year have you fallen or experienced a near fall? Yes   Current Diet Well Balanced Diet   Dental Exam Up to date   Eye Exam Not up to date   Exercise (times per week) 0 times per week   Current Exercise Activities Include None   Do you need help using the phone?  No   Are you deaf or do you have serious difficulty hearing?  Yes   Do you need help with transportation? Yes   Do you need help shopping? Yes   Do you need help preparing meals?  Yes   Do you need help with housework?  Yes   Do you need help with laundry? Yes   Do you need help taking your medications? Yes   Do you need help managing money? No   Have you felt unusual stress, anger or loneliness in the last month? Yes   Who do you live with? Child   If you need help, do you have trouble finding someone available to you? No   Have you been bothered in the last four weeks by sexual problems? No   Do you have difficulty concentrating, remembering or making decisions? No   daughters are rotating living with her    Does the patient have evidence of cognitive impairment? No    Asiprin use counseling: Does not need ASA (and currently is not on it)      Recent Lab Results:reviewed labs [place] 10/17 and 04/17    Visual Acuity:  No exam data present    Age-appropriate Screening Schedule:  Refer to the list below for future screening recommendations based on patient's age, sex and/or medical conditions. Orders for these recommended tests are listed in the plan section. The patient has been provided with a written plan.    Health Maintenance   Topic Date Due   • TDAP/TD VACCINES (1 - Tdap) 11/14/1945   • ZOSTER VACCINE  04/02/2016   • MAMMOGRAM  04/07/2016   • PNEUMOCOCCAL VACCINES (65+ LOW/MEDIUM RISK) (2 of 2 - PPSV23) 10/03/2017   • LIPID PANEL  01/20/2018   • INFLUENZA VACCINE  Completed        Subjective   History of Present Illness    Kathryn Robison is a 89 y.o. female who presents for an Annual Wellness Visit.    The  following portions of the patient's history were reviewed and updated as appropriate: allergies, current medications, past family history, past medical history, past social history, past surgical history and problem list.    Outpatient Medications Prior to Visit   Medication Sig Dispense Refill   • amiodarone (PACERONE) 200 MG tablet Take 200 mg by mouth.     • apixaban (ELIQUIS) 2.5 MG tablet tablet Take 2.5 mg by mouth.     • BYSTOLIC 20 MG tablet TAKE 1 TABLET DAILY 90 tablet 2   • calcium-vitamin D (OSCAL 500/200 D-3) 500-200 MG-UNIT per tablet Take 1 tablet by mouth daily.     • cetirizine (zyrTEC) 10 MG tablet Take 0.5 tablets by mouth Every Night. 30 tablet 5   • diphenhydrAMINE (BENADRYL) 25 mg Take 25 mg by mouth Every 6 (Six) Hours As Needed for itching.     • Docusate Sodium (DSS) 100 MG capsule Take 100 mg by mouth.     • furosemide (LASIX) 20 MG tablet Take 20 mg by mouth.     • levothyroxine (SYNTHROID) 125 MCG tablet Take 1 tablet by mouth Daily. 90 tablet 1   • losartan (COZAAR) 25 MG tablet Take 1 tablet by mouth Daily. 90 tablet 3   • Prenatal Vit-Fe Fumarate-FA (PRENATAL 27-1) 27-1 MG tablet tablet Take 1 tablet by mouth Daily. 30 tablet 0   • rosuvastatin (CRESTOR) 5 MG tablet Take 1 tablet by mouth Daily. 90 tablet 3   • sertraline (ZOLOFT) 25 MG tablet TAKE 1 TABLET DAILY 90 tablet 3   • spironolactone (ALDACTONE) 25 MG tablet Take 25 mg by mouth.       No facility-administered medications prior to visit.        Patient Active Problem List   Diagnosis   • Hypertension   • Ovarian cancer   • Lung cancer   • Chronic diastolic heart failure   • Elevated CA-125   • Vesicovaginal fistula   • Primary cancer of left upper lobe of lung   • Peritoneal carcinoma   • Paroxysmal atrial fibrillation   • Mitral regurgitation   • Mild pulmonary hypertension   • Metastatic cancer   • History of deep vein thrombosis (DVT) of lower extremity   • History of non-ST elevation myocardial infarction (NSTEMI)   •  "Hydroureteronephrosis   • Hypothyroidism   • Hyperlipidemia LDL goal <70       Advance Care Planning:  has an advance directive - a copy HAS NOT been provided    Identification of Risk Factors:  Risk factors include: cardiovascular risk, increased fall risk and hearing limitations.    Review of Systems    Compared to one year ago, the patient feels her physical health is worse.  Compared to one year ago, the patient feels her mental health is the same.    Objective     Physical Exam    Vitals:    11/10/17 1128   BP: 118/66   Pulse: 66   Temp: 98 °F (36.7 °C)   SpO2: 95%   Weight: 110 lb 6.4 oz (50.1 kg)   Height: 64\" (162.6 cm)   PainSc: 0-No pain       Body mass index is 18.95 kg/(m^2).  Discussed the patient's BMI with her. The BMI is below average; BMI management plan is completed.    Assessment/Plan   Patient Self-Management and Personalized Health Advice  The patient has been provided with information about: weight management, prevention of cardiac or vascular disease and fall prevention and preventive services including:   · Fall Risk assessment done cancer has since returned, doesn't want any intervention, already had flu shot this year, tdap ER 11/17, prevnar 10/16.    Visit Diagnoses:    ICD-10-CM ICD-9-CM   1. Medicare annual wellness visit, initial Z00.00 V70.0   2. Fall, subsequent encounter W19.XXXD V58.89     E888.9   3. Laceration of scalp, subsequent encounter S01.01XD V58.89     873.0   4. Open wound of right forearm, subsequent encounter S51.801D V58.89     881.00   5. Visit for suture removal Z48.02 V58.32       No orders of the defined types were placed in this encounter.      Outpatient Encounter Prescriptions as of 11/10/2017   Medication Sig Dispense Refill   • amiodarone (PACERONE) 200 MG tablet Take 200 mg by mouth.     • apixaban (ELIQUIS) 2.5 MG tablet tablet Take 2.5 mg by mouth.     • BYSTOLIC 20 MG tablet TAKE 1 TABLET DAILY 90 tablet 2   • calcium-vitamin D (OSCAL 500/200 D-3) 500-200 " MG-UNIT per tablet Take 1 tablet by mouth daily.     • cetirizine (zyrTEC) 10 MG tablet Take 0.5 tablets by mouth Every Night. 30 tablet 5   • diphenhydrAMINE (BENADRYL) 25 mg Take 25 mg by mouth Every 6 (Six) Hours As Needed for itching.     • Docusate Sodium (DSS) 100 MG capsule Take 100 mg by mouth.     • furosemide (LASIX) 20 MG tablet Take 20 mg by mouth.     • levothyroxine (SYNTHROID) 125 MCG tablet Take 1 tablet by mouth Daily. 90 tablet 1   • losartan (COZAAR) 25 MG tablet Take 1 tablet by mouth Daily. 90 tablet 3   • Prenatal Vit-Fe Fumarate-FA (PRENATAL 27-1) 27-1 MG tablet tablet Take 1 tablet by mouth Daily. 30 tablet 0   • rosuvastatin (CRESTOR) 5 MG tablet Take 1 tablet by mouth Daily. 90 tablet 3   • sertraline (ZOLOFT) 25 MG tablet TAKE 1 TABLET DAILY 90 tablet 3   • spironolactone (ALDACTONE) 25 MG tablet Take 25 mg by mouth.       No facility-administered encounter medications on file as of 11/10/2017.        Reviewed use of high risk medication in the elderly: yes  Reviewed for potential of harmful drug interactions in the elderly: yes    Follow Up:  No Follow-up on file.     An After Visit Summary and PPPS with all of these plans were given to the patient.

## 2017-11-10 NOTE — PROGRESS NOTES
Subjective   [place] MICHELLE Robison is a 89 y.o. female.     History of Present Illness   Here to  on recent fall at home, tried to sit on chair and missed hit head with laceration over R eyebrow and R cheek and hit R forearm, daughter was on a walk and when returned found her on ground and transported to [facility] 11/03/17 with 5 sutures places and imaging xray shoulder, wrist, elbow swelling no fracture, CT facial, spine, head NAD, now here with daughter for removal, no falls since hospital, with some weakness prolonged walking, no longer cook or bathing or dressing alone and some irritability at decline in function, mood stable on zoloft denies depression, no confusion, no HA dizziness, NV, blurry vision, CP dizziness HA but LE edema no longer seeing cardiology or taking lasix with CHF but recently found out cancer had returned and decided doesn't any intervention with last Cr 2 04/17, following low sodium diet, with CAD, CHF, and HTN on losartan 25 mg, bystolic 20 mg, amiodarone 200 mg, and eliquis no palpations but cont bleeding from forearm wound and healing slowly,last TSH abnl 2 wks ago on levothyroxine 125 mcg daily    The following portions of the patient's history were reviewed and updated as appropriate: allergies, current medications, past family history, past medical history, past social history, past surgical history and problem list.    Review of Systems   Constitutional: Negative for fever.   HENT: Negative for trouble swallowing and voice change.    Respiratory: Negative for cough, shortness of breath and wheezing.    Cardiovascular: Positive for leg swelling. Negative for chest pain and palpitations.   Gastrointestinal: Negative for abdominal distention, abdominal pain and anal bleeding.   Endocrine: Negative for cold intolerance, heat intolerance, polydipsia, polyphagia and polyuria.   Musculoskeletal: Positive for arthralgias and gait problem. Negative for back pain, joint swelling, myalgias, neck  pain and neck stiffness.   Skin: Positive for color change and wound.   Neurological: Negative for dizziness and headaches.   Psychiatric/Behavioral: Positive for agitation. Negative for behavioral problems, confusion, decreased concentration, dysphoric mood, hallucinations, self-injury, sleep disturbance and suicidal ideas. The patient is not nervous/anxious and is not hyperactive.    All other systems reviewed and are negative.      Objective   Physical Exam   Constitutional: She is oriented to person, place, and time. She appears well-developed and well-nourished.   HENT:   Head: Normocephalic and atraumatic.   Eyes: Conjunctivae and EOM are normal. Pupils are equal, round, and reactive to light.   Cardiovascular: Normal rate, regular rhythm and normal heart sounds.    Pulmonary/Chest: Effort normal and breath sounds normal.   Musculoskeletal: Normal range of motion. She exhibits edema (B LE pitting).   Gait stiff and unsteady, walking with daughter   Neurological: She is alert and oriented to person, place, and time.   Skin: Skin is warm and dry. There is erythema (well approximated linear wound healing R superior eyebrow with 5 sutures intact, no active bleeding,  with ecchymosis inferior R eye and some tenderness).   R forearm ulcer with skin thinning and minimal bleeding   Psychiatric: She has a normal mood and affect. Her behavior is normal. Judgment and thought content normal.   Vitals reviewed.  Suture Removal  Date/Time: 11/10/2017 1:18 PM  Performed by: JAMAR MARC  Authorized by: JAMAR MARC   Consent: Verbal consent obtained.  Risks and benefits: risks, benefits and alternatives were discussed  Consent given by: patient  Patient understanding: patient states understanding of the procedure being performed  Patient consent: the patient's understanding of the procedure matches consent given  Procedure consent: procedure consent matches procedure scheduled  Relevant documents:  relevant documents present and verified  Test results: test results available and properly labeled  Imaging studies: imaging studies available  Patient identity confirmed: verbally with patient  Body area: head/neck  Location details: right eyebrow  Wound Appearance: clean  Sutures Removed: 5  Post-removal: antibiotic ointment applied  Patient tolerance: Patient tolerated the procedure well with no immediate complications        Assessment/Plan   Kathryn was seen today for suture / staple removal and wound lt arm check.    Diagnoses and all orders for this visit:    Medicare annual wellness visit, initial    Fall, subsequent encounter    Laceration of scalp, subsequent encounter    Open wound of right forearm, subsequent encounter    Visit for suture removal    Other orders  -     Suture Removal    medicare wellness today, reviewed hospital records, imaging, and labs, 5 sutures removed after cleaned with alcohol pad without complication and abx ointment applied, R forearm abx ointment applied and gauze dressing applied, RTC if sx persist or worsen, Current outpatient and discharge medications have been reconciled for the patient.  Kassandra Gillespie, APRN

## 2017-11-10 NOTE — PATIENT INSTRUCTIONS
medicare wellness today, reviewed hospital records, imaging, and labs, 5 sutures removed after cleaned with alcohol pad without complication and abx ointment applied, R forearm abx ointment applied and gauze dressing applied, RTC if sx persist or worsen, Current outpatient and discharge medications have been reconciled for the patient.  Kassandra Gillespie, APRN

## 2017-11-14 ENCOUNTER — TELEPHONE (OUTPATIENT)
Dept: FAMILY MEDICINE CLINIC | Facility: CLINIC | Age: 82
End: 2017-11-14

## 2017-11-14 NOTE — TELEPHONE ENCOUNTER
PATIENT NEEDS THE TUBE THAT IS ATTACHED TO HER URINE BAG NEEDS TO BE CHANGED. PATIENT NEEDS A REFERRAL FOR SOMEONE TO COME TO HER HOME AND BE ABLE TO CHANGE THIS TUBE

## 2017-11-14 NOTE — TELEPHONE ENCOUNTER
Dr. Vela put this in and they should call them to order home health assistance if a problem.[facility]

## 2017-11-27 ENCOUNTER — TELEPHONE (OUTPATIENT)
Dept: FAMILY MEDICINE CLINIC | Facility: CLINIC | Age: 82
End: 2017-11-27

## 2017-11-27 RX ORDER — SERTRALINE HYDROCHLORIDE 25 MG/1
TABLET, FILM COATED ORAL
Qty: 90 TABLET | Refills: 3 | Status: SHIPPED | OUTPATIENT
Start: 2017-11-27

## 2018-01-01 ENCOUNTER — TELEPHONE (OUTPATIENT)
Dept: FAMILY MEDICINE CLINIC | Facility: CLINIC | Age: 83
End: 2018-01-01

## 2018-01-01 ENCOUNTER — APPOINTMENT (OUTPATIENT)
Dept: CT IMAGING | Facility: HOSPITAL | Age: 83
End: 2018-01-01

## 2018-01-01 ENCOUNTER — ANESTHESIA EVENT (OUTPATIENT)
Dept: PERIOP | Facility: HOSPITAL | Age: 83
End: 2018-01-01

## 2018-01-01 ENCOUNTER — HOSPITAL ENCOUNTER (INPATIENT)
Facility: HOSPITAL | Age: 83
LOS: 4 days | Discharge: NURSING, REHAB OR LONG-TERM CARE | End: 2018-11-17
Attending: EMERGENCY MEDICINE | Admitting: ORTHOPAEDIC SURGERY

## 2018-01-01 ENCOUNTER — APPOINTMENT (OUTPATIENT)
Dept: GENERAL RADIOLOGY | Facility: HOSPITAL | Age: 83
End: 2018-01-01

## 2018-01-01 ENCOUNTER — APPOINTMENT (OUTPATIENT)
Dept: GENERAL RADIOLOGY | Facility: HOSPITAL | Age: 83
End: 2018-01-01
Attending: ORTHOPAEDIC SURGERY

## 2018-01-01 ENCOUNTER — ANESTHESIA (OUTPATIENT)
Dept: PERIOP | Facility: HOSPITAL | Age: 83
End: 2018-01-01

## 2018-01-01 VITALS
BODY MASS INDEX: 16.83 KG/M2 | DIASTOLIC BLOOD PRESSURE: 53 MMHG | SYSTOLIC BLOOD PRESSURE: 117 MMHG | OXYGEN SATURATION: 98 % | RESPIRATION RATE: 18 BRPM | HEART RATE: 88 BPM | HEIGHT: 63 IN | WEIGHT: 95 LBS | TEMPERATURE: 99 F

## 2018-01-01 DIAGNOSIS — S09.90XA CLOSED HEAD INJURY, INITIAL ENCOUNTER: ICD-10-CM
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
## 2018-01-01 DIAGNOSIS — S72.145A CLOSED NONDISPLACED INTERTROCHANTERIC FRACTURE OF LEFT FEMUR, INITIAL ENCOUNTER (HCC): Primary | ICD-10-CM

## 2018-01-01 LAB
ABO + RH BLD: NORMAL
ABO + RH BLD: NORMAL
ABO GROUP BLD: NORMAL
ABO GROUP BLD: NORMAL
ANION GAP SERPL CALCULATED.3IONS-SCNC: 10.5 MMOL/L
ANION GAP SERPL CALCULATED.3IONS-SCNC: 10.7 MMOL/L
ANION GAP SERPL CALCULATED.3IONS-SCNC: 7.7 MMOL/L
ANTI-D: NORMAL
BASOPHILS # BLD AUTO: 0.01 10*3/MM3 (ref 0–0.2)
BASOPHILS NFR BLD AUTO: 0.1 % (ref 0–1.5)
BH BB BLOOD EXPIRATION DATE: NORMAL
BH BB BLOOD EXPIRATION DATE: NORMAL
BH BB BLOOD TYPE BARCODE: 600
BH BB BLOOD TYPE BARCODE: 600
BH BB DISPENSE STATUS: NORMAL
BH BB DISPENSE STATUS: NORMAL
BH BB PRODUCT CODE: NORMAL
BH BB PRODUCT CODE: NORMAL
BH BB UNIT NUMBER: NORMAL
BH BB UNIT NUMBER: NORMAL
BLD GP AB SCN SERPL QL: POSITIVE
BUN BLD-MCNC: 26 MG/DL (ref 8–23)
BUN BLD-MCNC: 28 MG/DL (ref 8–23)
BUN BLD-MCNC: 30 MG/DL (ref 8–23)
BUN/CREAT SERPL: 24.5 (ref 7–25)
BUN/CREAT SERPL: 25.4 (ref 7–25)
BUN/CREAT SERPL: 26.7 (ref 7–25)
CALCIUM SPEC-SCNC: 8.4 MG/DL (ref 8.2–9.6)
CALCIUM SPEC-SCNC: 8.4 MG/DL (ref 8.2–9.6)
CALCIUM SPEC-SCNC: 8.5 MG/DL (ref 8.2–9.6)
CHLORIDE SERPL-SCNC: 97 MMOL/L (ref 98–107)
CHLORIDE SERPL-SCNC: 97 MMOL/L (ref 98–107)
CHLORIDE SERPL-SCNC: 98 MMOL/L (ref 98–107)
CO2 SERPL-SCNC: 25.3 MMOL/L (ref 22–29)
CO2 SERPL-SCNC: 25.5 MMOL/L (ref 22–29)
CO2 SERPL-SCNC: 26.3 MMOL/L (ref 22–29)
CREAT BLD-MCNC: 1.05 MG/DL (ref 0.57–1)
CREAT BLD-MCNC: 1.06 MG/DL (ref 0.57–1)
CREAT BLD-MCNC: 1.18 MG/DL (ref 0.57–1)
CROSSMATCH INTERPRETATION: NORMAL
CROSSMATCH INTERPRETATION: NORMAL
CYTO UR: NORMAL
DEPRECATED RDW RBC AUTO: 52.3 FL (ref 37–54)
DEPRECATED RDW RBC AUTO: 53.4 FL (ref 37–54)
DEPRECATED RDW RBC AUTO: 53.7 FL (ref 37–54)
DEPRECATED RDW RBC AUTO: 53.8 FL (ref 37–54)
EOSINOPHIL # BLD AUTO: 0.01 10*3/MM3 (ref 0–0.7)
EOSINOPHIL NFR BLD AUTO: 0.1 % (ref 0.3–6.2)
ERYTHROCYTE [DISTWIDTH] IN BLOOD BY AUTOMATED COUNT: 14.7 % (ref 11.7–13)
ERYTHROCYTE [DISTWIDTH] IN BLOOD BY AUTOMATED COUNT: 14.9 % (ref 11.7–13)
GFR SERPL CREATININE-BSD FRML MDRD: 43 ML/MIN/1.73
GFR SERPL CREATININE-BSD FRML MDRD: 49 ML/MIN/1.73
GFR SERPL CREATININE-BSD FRML MDRD: 49 ML/MIN/1.73
GLUCOSE BLD-MCNC: 103 MG/DL (ref 65–99)
GLUCOSE BLD-MCNC: 123 MG/DL (ref 65–99)
GLUCOSE BLD-MCNC: 123 MG/DL (ref 65–99)
HCT VFR BLD AUTO: 25.4 % (ref 35.6–45.5)
HCT VFR BLD AUTO: 26.7 % (ref 35.6–45.5)
HCT VFR BLD AUTO: 29.1 % (ref 35.6–45.5)
HCT VFR BLD AUTO: 31.7 % (ref 35.6–45.5)
HGB BLD-MCNC: 8 G/DL (ref 11.9–15.5)
HGB BLD-MCNC: 8.4 G/DL (ref 11.9–15.5)
HGB BLD-MCNC: 9.1 G/DL (ref 11.9–15.5)
HGB BLD-MCNC: 9.7 G/DL (ref 11.9–15.5)
HOLD SPECIMEN: NORMAL
IMM GRANULOCYTES # BLD: 0.11 10*3/MM3 (ref 0–0.03)
IMM GRANULOCYTES NFR BLD: 0.6 % (ref 0–0.5)
LAB AP CASE REPORT: NORMAL
LYMPHOCYTES # BLD AUTO: 1.1 10*3/MM3 (ref 0.9–4.8)
LYMPHOCYTES NFR BLD AUTO: 5.6 % (ref 19.6–45.3)
MCH RBC QN AUTO: 30.1 PG (ref 26.9–32)
MCH RBC QN AUTO: 30.3 PG (ref 26.9–32)
MCH RBC QN AUTO: 30.7 PG (ref 26.9–32)
MCH RBC QN AUTO: 30.9 PG (ref 26.9–32)
MCHC RBC AUTO-ENTMCNC: 30.6 G/DL (ref 32.4–36.3)
MCHC RBC AUTO-ENTMCNC: 31.3 G/DL (ref 32.4–36.3)
MCHC RBC AUTO-ENTMCNC: 31.5 G/DL (ref 32.4–36.3)
MCHC RBC AUTO-ENTMCNC: 31.5 G/DL (ref 32.4–36.3)
MCV RBC AUTO: 96.4 FL (ref 80.5–98.2)
MCV RBC AUTO: 98.1 FL (ref 80.5–98.2)
MCV RBC AUTO: 98.3 FL (ref 80.5–98.2)
MCV RBC AUTO: 98.4 FL (ref 80.5–98.2)
MONOCYTES # BLD AUTO: 0.76 10*3/MM3 (ref 0.2–1.2)
MONOCYTES NFR BLD AUTO: 3.9 % (ref 5–12)
NEUTROPHILS # BLD AUTO: 17.62 10*3/MM3 (ref 1.9–8.1)
NEUTROPHILS NFR BLD AUTO: 89.7 % (ref 42.7–76)
PATH REPORT.FINAL DX SPEC: NORMAL
PATH REPORT.GROSS SPEC: NORMAL
PLATELET # BLD AUTO: 158 10*3/MM3 (ref 140–500)
PLATELET # BLD AUTO: 163 10*3/MM3 (ref 140–500)
PLATELET # BLD AUTO: 175 10*3/MM3 (ref 140–500)
PLATELET # BLD AUTO: 209 10*3/MM3 (ref 140–500)
PMV BLD AUTO: 8.1 FL (ref 6–12)
PMV BLD AUTO: 8.2 FL (ref 6–12)
PMV BLD AUTO: 8.2 FL (ref 6–12)
PMV BLD AUTO: 8.3 FL (ref 6–12)
POTASSIUM BLD-SCNC: 4.2 MMOL/L (ref 3.5–5.2)
POTASSIUM BLD-SCNC: 4.6 MMOL/L (ref 3.5–5.2)
POTASSIUM BLD-SCNC: 4.6 MMOL/L (ref 3.5–5.2)
RBC # BLD AUTO: 2.59 10*6/MM3 (ref 3.9–5.2)
RBC # BLD AUTO: 2.77 10*6/MM3 (ref 3.9–5.2)
RBC # BLD AUTO: 2.96 10*6/MM3 (ref 3.9–5.2)
RBC # BLD AUTO: 3.22 10*6/MM3 (ref 3.9–5.2)
RH BLD: NEGATIVE
RH BLD: NEGATIVE
SODIUM BLD-SCNC: 132 MMOL/L (ref 136–145)
SODIUM BLD-SCNC: 133 MMOL/L (ref 136–145)
SODIUM BLD-SCNC: 133 MMOL/L (ref 136–145)
T&S EXPIRATION DATE: NORMAL
UNIT  ABO: NORMAL
UNIT  ABO: NORMAL
UNIT  RH: NORMAL
UNIT  RH: NORMAL
WBC NRBC COR # BLD: 16.07 10*3/MM3 (ref 4.5–10.7)
WBC NRBC COR # BLD: 16.45 10*3/MM3 (ref 4.5–10.7)
WBC NRBC COR # BLD: 18.89 10*3/MM3 (ref 4.5–10.7)
WBC NRBC COR # BLD: 19.61 10*3/MM3 (ref 4.5–10.7)
WHOLE BLOOD HOLD SPECIMEN: NORMAL

## 2018-01-01 PROCEDURE — 80048 BASIC METABOLIC PNL TOTAL CA: CPT | Performed by: HOSPITALIST

## 2018-01-01 PROCEDURE — 25010000002 ENOXAPARIN PER 10 MG: Performed by: ORTHOPAEDIC SURGERY

## 2018-01-01 PROCEDURE — 86920 COMPATIBILITY TEST SPIN: CPT

## 2018-01-01 PROCEDURE — 88304 TISSUE EXAM BY PATHOLOGIST: CPT | Performed by: ORTHOPAEDIC SURGERY

## 2018-01-01 PROCEDURE — 25010000002 FENTANYL CITRATE (PF) 100 MCG/2ML SOLUTION: Performed by: NURSE ANESTHETIST, CERTIFIED REGISTERED

## 2018-01-01 PROCEDURE — 86870 RBC ANTIBODY IDENTIFICATION: CPT | Performed by: EMERGENCY MEDICINE

## 2018-01-01 PROCEDURE — 25010000002 PROPOFOL 10 MG/ML EMULSION: Performed by: NURSE ANESTHETIST, CERTIFIED REGISTERED

## 2018-01-01 PROCEDURE — 85027 COMPLETE CBC AUTOMATED: CPT | Performed by: ORTHOPAEDIC SURGERY

## 2018-01-01 PROCEDURE — 63710000001 DEXAMETHASONE PER 0.25 MG: Performed by: HOSPITALIST

## 2018-01-01 PROCEDURE — 85027 COMPLETE CBC AUTOMATED: CPT | Performed by: HOSPITALIST

## 2018-01-01 PROCEDURE — 25010000002 MORPHINE PER 10 MG: Performed by: HOSPITALIST

## 2018-01-01 PROCEDURE — 88311 DECALCIFY TISSUE: CPT | Performed by: ORTHOPAEDIC SURGERY

## 2018-01-01 PROCEDURE — 70450 CT HEAD/BRAIN W/O DYE: CPT

## 2018-01-01 PROCEDURE — 73502 X-RAY EXAM HIP UNI 2-3 VIEWS: CPT

## 2018-01-01 PROCEDURE — 99284 EMERGENCY DEPT VISIT MOD MDM: CPT

## 2018-01-01 PROCEDURE — 86901 BLOOD TYPING SEROLOGIC RH(D): CPT

## 2018-01-01 PROCEDURE — 86900 BLOOD TYPING SEROLOGIC ABO: CPT

## 2018-01-01 PROCEDURE — 97110 THERAPEUTIC EXERCISES: CPT

## 2018-01-01 PROCEDURE — 25010000002 VANCOMYCIN 750 MG RECONSTITUTED SOLUTION: Performed by: ORTHOPAEDIC SURGERY

## 2018-01-01 PROCEDURE — 97162 PT EVAL MOD COMPLEX 30 MIN: CPT | Performed by: PHYSICAL THERAPIST

## 2018-01-01 PROCEDURE — 25010000002 ONDANSETRON PER 1 MG: Performed by: NURSE ANESTHETIST, CERTIFIED REGISTERED

## 2018-01-01 PROCEDURE — 86900 BLOOD TYPING SEROLOGIC ABO: CPT | Performed by: EMERGENCY MEDICINE

## 2018-01-01 PROCEDURE — C1713 ANCHOR/SCREW BN/BN,TIS/BN: HCPCS | Performed by: ORTHOPAEDIC SURGERY

## 2018-01-01 PROCEDURE — 0QS706Z REPOSITION LEFT UPPER FEMUR WITH INTRAMEDULLARY INTERNAL FIXATION DEVICE, OPEN APPROACH: ICD-10-PCS | Performed by: ORTHOPAEDIC SURGERY

## 2018-01-01 PROCEDURE — 80048 BASIC METABOLIC PNL TOTAL CA: CPT | Performed by: EMERGENCY MEDICINE

## 2018-01-01 PROCEDURE — 86922 COMPATIBILITY TEST ANTIGLOB: CPT

## 2018-01-01 PROCEDURE — 85025 COMPLETE CBC W/AUTO DIFF WBC: CPT | Performed by: EMERGENCY MEDICINE

## 2018-01-01 PROCEDURE — 25010000002 DEXAMETHASONE PER 1 MG: Performed by: NURSE ANESTHETIST, CERTIFIED REGISTERED

## 2018-01-01 PROCEDURE — 86850 RBC ANTIBODY SCREEN: CPT | Performed by: EMERGENCY MEDICINE

## 2018-01-01 PROCEDURE — 86901 BLOOD TYPING SEROLOGIC RH(D): CPT | Performed by: EMERGENCY MEDICINE

## 2018-01-01 PROCEDURE — 97110 THERAPEUTIC EXERCISES: CPT | Performed by: PHYSICAL THERAPIST

## 2018-01-01 PROCEDURE — 25010000002 VANCOMYCIN PER 500 MG: Performed by: ORTHOPAEDIC SURGERY

## 2018-01-01 PROCEDURE — 25010000002 ONDANSETRON PER 1 MG: Performed by: HOSPITALIST

## 2018-01-01 PROCEDURE — 71045 X-RAY EXAM CHEST 1 VIEW: CPT

## 2018-01-01 PROCEDURE — 73501 X-RAY EXAM HIP UNI 1 VIEW: CPT

## 2018-01-01 PROCEDURE — 76000 FLUOROSCOPY <1 HR PHYS/QHP: CPT

## 2018-01-01 PROCEDURE — 73700 CT LOWER EXTREMITY W/O DYE: CPT

## 2018-01-01 DEVICE — TRIGEN LOW PROFILE SCREW 5.0MM X 30MM
Type: IMPLANTABLE DEVICE | Site: FEMUR | Status: FUNCTIONAL
Brand: TRIGEN

## 2018-01-01 DEVICE — TRIGEN INTERTAN 10S 10MM X 18CM 130DEGREE
Type: IMPLANTABLE DEVICE | Site: FEMUR | Status: FUNCTIONAL
Brand: TRIGEN

## 2018-01-01 DEVICE — INTERTAN LAG/COMPRESSION SCREW KIT                                    90MM / 85MM
Type: IMPLANTABLE DEVICE | Site: FEMUR | Status: FUNCTIONAL
Brand: TRIGEN

## 2018-01-01 RX ORDER — HYDROCODONE BITARTRATE AND ACETAMINOPHEN 5; 325 MG/1; MG/1
1 TABLET ORAL EVERY 6 HOURS PRN
Status: DISCONTINUED | OUTPATIENT
Start: 2018-01-01 | End: 2018-01-01

## 2018-01-01 RX ORDER — PROMETHAZINE HYDROCHLORIDE 25 MG/ML
12.5 INJECTION, SOLUTION INTRAMUSCULAR; INTRAVENOUS ONCE AS NEEDED
Status: DISCONTINUED | OUTPATIENT
Start: 2018-01-01 | End: 2018-01-01 | Stop reason: HOSPADM

## 2018-01-01 RX ORDER — ACETAMINOPHEN 325 MG/1
650 TABLET ORAL EVERY 4 HOURS PRN
Status: DISCONTINUED | OUTPATIENT
Start: 2018-01-01 | End: 2018-01-01 | Stop reason: HOSPADM

## 2018-01-01 RX ORDER — ACETAMINOPHEN 500 MG
500 TABLET ORAL EVERY 6 HOURS PRN
COMMUNITY
End: 2018-01-01 | Stop reason: HOSPADM

## 2018-01-01 RX ORDER — ONDANSETRON HYDROCHLORIDE 8 MG/1
TABLET, FILM COATED ORAL EVERY 6 HOURS PRN
COMMUNITY

## 2018-01-01 RX ORDER — MENTHOL AND ZINC OXIDE .44; 20.625 G/100G; G/100G
OINTMENT TOPICAL EVERY 12 HOURS SCHEDULED
Status: DISCONTINUED | OUTPATIENT
Start: 2018-01-01 | End: 2018-01-01 | Stop reason: HOSPADM

## 2018-01-01 RX ORDER — BISACODYL 10 MG
10 SUPPOSITORY, RECTAL RECTAL DAILY
Status: DISCONTINUED | OUTPATIENT
Start: 2018-01-01 | End: 2018-01-01 | Stop reason: HOSPADM

## 2018-01-01 RX ORDER — EPHEDRINE SULFATE 50 MG/ML
5 INJECTION, SOLUTION INTRAVENOUS ONCE AS NEEDED
Status: DISCONTINUED | OUTPATIENT
Start: 2018-01-01 | End: 2018-01-01 | Stop reason: HOSPADM

## 2018-01-01 RX ORDER — VANCOMYCIN HYDROCHLORIDE 1 G/20ML
1 INJECTION, POWDER, LYOPHILIZED, FOR SOLUTION INTRAVENOUS ONCE
Status: DISCONTINUED | OUTPATIENT
Start: 2018-01-01 | End: 2018-01-01

## 2018-01-01 RX ORDER — ACETAMINOPHEN 325 MG/1
650 TABLET ORAL EVERY 4 HOURS PRN
Start: 2018-01-01

## 2018-01-01 RX ORDER — ONDANSETRON 2 MG/ML
INJECTION INTRAMUSCULAR; INTRAVENOUS AS NEEDED
Status: DISCONTINUED | OUTPATIENT
Start: 2018-01-01 | End: 2018-01-01 | Stop reason: SURG

## 2018-01-01 RX ORDER — FAMOTIDINE 10 MG/ML
20 INJECTION, SOLUTION INTRAVENOUS ONCE
Status: COMPLETED | OUTPATIENT
Start: 2018-01-01 | End: 2018-01-01

## 2018-01-01 RX ORDER — DIPHENHYDRAMINE HYDROCHLORIDE 50 MG/ML
12.5 INJECTION INTRAMUSCULAR; INTRAVENOUS
Status: DISCONTINUED | OUTPATIENT
Start: 2018-01-01 | End: 2018-01-01 | Stop reason: HOSPADM

## 2018-01-01 RX ORDER — MORPHINE SULFATE 2 MG/ML
2 INJECTION, SOLUTION INTRAMUSCULAR; INTRAVENOUS
Status: DISCONTINUED | OUTPATIENT
Start: 2018-01-01 | End: 2018-01-01

## 2018-01-01 RX ORDER — BUPIVACAINE HYDROCHLORIDE AND EPINEPHRINE 5; 5 MG/ML; UG/ML
INJECTION, SOLUTION PERINEURAL AS NEEDED
Status: DISCONTINUED | OUTPATIENT
Start: 2018-01-01 | End: 2018-01-01 | Stop reason: HOSPADM

## 2018-01-01 RX ORDER — MIDAZOLAM HYDROCHLORIDE 1 MG/ML
1 INJECTION INTRAMUSCULAR; INTRAVENOUS
Status: DISCONTINUED | OUTPATIENT
Start: 2018-01-01 | End: 2018-01-01 | Stop reason: HOSPADM

## 2018-01-01 RX ORDER — FLUMAZENIL 0.1 MG/ML
0.2 INJECTION INTRAVENOUS AS NEEDED
Status: DISCONTINUED | OUTPATIENT
Start: 2018-01-01 | End: 2018-01-01 | Stop reason: HOSPADM

## 2018-01-01 RX ORDER — VANCOMYCIN HYDROCHLORIDE 1 G/200ML
1000 INJECTION, SOLUTION INTRAVENOUS EVERY 12 HOURS
Status: DISCONTINUED | OUTPATIENT
Start: 2018-01-01 | End: 2018-01-01

## 2018-01-01 RX ORDER — LIDOCAINE HYDROCHLORIDE 20 MG/ML
INJECTION, SOLUTION INFILTRATION; PERINEURAL AS NEEDED
Status: DISCONTINUED | OUTPATIENT
Start: 2018-01-01 | End: 2018-01-01 | Stop reason: SURG

## 2018-01-01 RX ORDER — PROMETHAZINE HYDROCHLORIDE 25 MG/1
25 TABLET ORAL ONCE AS NEEDED
Status: DISCONTINUED | OUTPATIENT
Start: 2018-01-01 | End: 2018-01-01 | Stop reason: HOSPADM

## 2018-01-01 RX ORDER — MIDAZOLAM HYDROCHLORIDE 1 MG/ML
2 INJECTION INTRAMUSCULAR; INTRAVENOUS
Status: DISCONTINUED | OUTPATIENT
Start: 2018-01-01 | End: 2018-01-01 | Stop reason: HOSPADM

## 2018-01-01 RX ORDER — FENTANYL CITRATE 50 UG/ML
INJECTION, SOLUTION INTRAMUSCULAR; INTRAVENOUS AS NEEDED
Status: DISCONTINUED | OUTPATIENT
Start: 2018-01-01 | End: 2018-01-01 | Stop reason: SURG

## 2018-01-01 RX ORDER — MORPHINE SULFATE 20 MG/ML
5 SOLUTION ORAL
COMMUNITY
End: 2018-01-01 | Stop reason: HOSPADM

## 2018-01-01 RX ORDER — PROMETHAZINE HYDROCHLORIDE 25 MG/1
25 SUPPOSITORY RECTAL ONCE AS NEEDED
Status: DISCONTINUED | OUTPATIENT
Start: 2018-01-01 | End: 2018-01-01 | Stop reason: HOSPADM

## 2018-01-01 RX ORDER — DEXAMETHASONE SODIUM PHOSPHATE 10 MG/ML
INJECTION INTRAMUSCULAR; INTRAVENOUS AS NEEDED
Status: DISCONTINUED | OUTPATIENT
Start: 2018-01-01 | End: 2018-01-01 | Stop reason: SURG

## 2018-01-01 RX ORDER — LEVOTHYROXINE SODIUM 0.12 MG/1
125 TABLET ORAL DAILY
Qty: 90 TABLET | Refills: 1 | Status: SHIPPED | OUTPATIENT
Start: 2018-01-01

## 2018-01-01 RX ORDER — SODIUM CHLORIDE 0.9 % (FLUSH) 0.9 %
1-10 SYRINGE (ML) INJECTION AS NEEDED
Status: DISCONTINUED | OUTPATIENT
Start: 2018-01-01 | End: 2018-01-01 | Stop reason: HOSPADM

## 2018-01-01 RX ORDER — AMOXICILLIN 250 MG
2 CAPSULE ORAL 2 TIMES DAILY
COMMUNITY

## 2018-01-01 RX ORDER — SENNA AND DOCUSATE SODIUM 50; 8.6 MG/1; MG/1
2 TABLET, FILM COATED ORAL 2 TIMES DAILY
Status: DISCONTINUED | OUTPATIENT
Start: 2018-01-01 | End: 2018-01-01 | Stop reason: HOSPADM

## 2018-01-01 RX ORDER — LIDOCAINE HYDROCHLORIDE 10 MG/ML
0.5 INJECTION, SOLUTION EPIDURAL; INFILTRATION; INTRACAUDAL; PERINEURAL ONCE AS NEEDED
Status: DISCONTINUED | OUTPATIENT
Start: 2018-01-01 | End: 2018-01-01 | Stop reason: HOSPADM

## 2018-01-01 RX ORDER — IPRATROPIUM BROMIDE AND ALBUTEROL SULFATE 2.5; .5 MG/3ML; MG/3ML
3 SOLUTION RESPIRATORY (INHALATION) ONCE AS NEEDED
Status: DISCONTINUED | OUTPATIENT
Start: 2018-01-01 | End: 2018-01-01 | Stop reason: HOSPADM

## 2018-01-01 RX ORDER — SERTRALINE HYDROCHLORIDE 25 MG/1
25 TABLET, FILM COATED ORAL DAILY
Status: DISCONTINUED | OUTPATIENT
Start: 2018-01-01 | End: 2018-01-01 | Stop reason: HOSPADM

## 2018-01-01 RX ORDER — DIAPER,BRIEF,INFANT-TODD,DISP
1 EACH MISCELLANEOUS TAKE AS DIRECTED
Status: DISCONTINUED | OUTPATIENT
Start: 2018-01-01 | End: 2018-01-01 | Stop reason: HOSPADM

## 2018-01-01 RX ORDER — ONDANSETRON 2 MG/ML
4 INJECTION INTRAMUSCULAR; INTRAVENOUS ONCE AS NEEDED
Status: DISCONTINUED | OUTPATIENT
Start: 2018-01-01 | End: 2018-01-01 | Stop reason: HOSPADM

## 2018-01-01 RX ORDER — SODIUM CHLORIDE, SODIUM LACTATE, POTASSIUM CHLORIDE, CALCIUM CHLORIDE 600; 310; 30; 20 MG/100ML; MG/100ML; MG/100ML; MG/100ML
9 INJECTION, SOLUTION INTRAVENOUS CONTINUOUS
Status: DISCONTINUED | OUTPATIENT
Start: 2018-01-01 | End: 2018-01-01

## 2018-01-01 RX ORDER — PROPOFOL 10 MG/ML
VIAL (ML) INTRAVENOUS AS NEEDED
Status: DISCONTINUED | OUTPATIENT
Start: 2018-01-01 | End: 2018-01-01 | Stop reason: SURG

## 2018-01-01 RX ORDER — FENTANYL CITRATE 50 UG/ML
25 INJECTION, SOLUTION INTRAMUSCULAR; INTRAVENOUS
Status: DISCONTINUED | OUTPATIENT
Start: 2018-01-01 | End: 2018-01-01 | Stop reason: HOSPADM

## 2018-01-01 RX ORDER — LEVOTHYROXINE SODIUM 0.12 MG/1
125 TABLET ORAL DAILY
Status: DISCONTINUED | OUTPATIENT
Start: 2018-01-01 | End: 2018-01-01 | Stop reason: HOSPADM

## 2018-01-01 RX ORDER — FENTANYL CITRATE 50 UG/ML
50 INJECTION, SOLUTION INTRAMUSCULAR; INTRAVENOUS
Status: DISCONTINUED | OUTPATIENT
Start: 2018-01-01 | End: 2018-01-01 | Stop reason: HOSPADM

## 2018-01-01 RX ORDER — LORATADINE 10 MG/1
10 CAPSULE, LIQUID FILLED ORAL DAILY
COMMUNITY

## 2018-01-01 RX ORDER — MORPHINE SULFATE 20 MG/ML
5 SOLUTION ORAL
Status: DISCONTINUED | OUTPATIENT
Start: 2018-01-01 | End: 2018-01-01 | Stop reason: HOSPADM

## 2018-01-01 RX ORDER — PROMETHAZINE HYDROCHLORIDE 25 MG/1
12.5 TABLET ORAL ONCE AS NEEDED
Status: DISCONTINUED | OUTPATIENT
Start: 2018-01-01 | End: 2018-01-01 | Stop reason: HOSPADM

## 2018-01-01 RX ORDER — DEXAMETHASONE 6 MG/1
6 TABLET ORAL
COMMUNITY

## 2018-01-01 RX ORDER — ONDANSETRON 2 MG/ML
4 INJECTION INTRAMUSCULAR; INTRAVENOUS EVERY 4 HOURS PRN
Status: DISCONTINUED | OUTPATIENT
Start: 2018-01-01 | End: 2018-01-01 | Stop reason: HOSPADM

## 2018-01-01 RX ORDER — VANCOMYCIN HYDROCHLORIDE 1 G/200ML
1 INJECTION, SOLUTION INTRAVENOUS ONCE
Status: COMPLETED | OUTPATIENT
Start: 2018-01-01 | End: 2018-01-01

## 2018-01-01 RX ORDER — ACETAMINOPHEN 500 MG
500 TABLET ORAL EVERY 6 HOURS PRN
Status: DISCONTINUED | OUTPATIENT
Start: 2018-01-01 | End: 2018-01-01

## 2018-01-01 RX ORDER — DIAPER,BRIEF,INFANT-TODD,DISP
1 EACH MISCELLANEOUS TAKE AS DIRECTED
COMMUNITY

## 2018-01-01 RX ORDER — BISACODYL 10 MG
10 SUPPOSITORY, RECTAL RECTAL DAILY PRN
Status: DISCONTINUED | OUTPATIENT
Start: 2018-01-01 | End: 2018-01-01 | Stop reason: HOSPADM

## 2018-01-01 RX ORDER — LABETALOL HYDROCHLORIDE 5 MG/ML
5 INJECTION, SOLUTION INTRAVENOUS
Status: DISCONTINUED | OUTPATIENT
Start: 2018-01-01 | End: 2018-01-01 | Stop reason: HOSPADM

## 2018-01-01 RX ORDER — FAMOTIDINE 20 MG/1
20 TABLET, FILM COATED ORAL DAILY
Start: 2018-01-01

## 2018-01-01 RX ORDER — NALOXONE HCL 0.4 MG/ML
0.2 VIAL (ML) INJECTION AS NEEDED
Status: DISCONTINUED | OUTPATIENT
Start: 2018-01-01 | End: 2018-01-01 | Stop reason: HOSPADM

## 2018-01-01 RX ORDER — ONDANSETRON 4 MG/1
4 TABLET, FILM COATED ORAL EVERY 6 HOURS PRN
Status: DISCONTINUED | OUTPATIENT
Start: 2018-01-01 | End: 2018-01-01 | Stop reason: HOSPADM

## 2018-01-01 RX ADMIN — SENNOSIDES AND DOCUSATE SODIUM 2 TABLET: 8.6; 5 TABLET ORAL at 21:42

## 2018-01-01 RX ADMIN — SERTRALINE 25 MG: 25 TABLET, FILM COATED ORAL at 09:09

## 2018-01-01 RX ADMIN — FENTANYL CITRATE 25 MCG: 50 INJECTION, SOLUTION INTRAMUSCULAR; INTRAVENOUS at 15:34

## 2018-01-01 RX ADMIN — ONDANSETRON 4 MG: 2 INJECTION INTRAMUSCULAR; INTRAVENOUS at 18:18

## 2018-01-01 RX ADMIN — SENNOSIDES AND DOCUSATE SODIUM 2 TABLET: 8.6; 5 TABLET ORAL at 09:08

## 2018-01-01 RX ADMIN — BISACODYL 10 MG: 10 SUPPOSITORY RECTAL at 08:33

## 2018-01-01 RX ADMIN — ACETAMINOPHEN 650 MG: 325 TABLET, FILM COATED ORAL at 10:55

## 2018-01-01 RX ADMIN — Medication: at 09:09

## 2018-01-01 RX ADMIN — ONDANSETRON 4 MG: 2 INJECTION INTRAMUSCULAR; INTRAVENOUS at 13:09

## 2018-01-01 RX ADMIN — FENTANYL CITRATE 25 MCG: 50 INJECTION, SOLUTION INTRAMUSCULAR; INTRAVENOUS at 15:20

## 2018-01-01 RX ADMIN — ACETAMINOPHEN 650 MG: 325 TABLET, FILM COATED ORAL at 18:43

## 2018-01-01 RX ADMIN — SENNOSIDES AND DOCUSATE SODIUM 2 TABLET: 8.6; 5 TABLET ORAL at 08:11

## 2018-01-01 RX ADMIN — Medication 500 UNITS: at 10:55

## 2018-01-01 RX ADMIN — SERTRALINE 25 MG: 25 TABLET, FILM COATED ORAL at 08:11

## 2018-01-01 RX ADMIN — LEVOTHYROXINE SODIUM 125 MCG: 125 TABLET ORAL at 08:32

## 2018-01-01 RX ADMIN — DEXAMETHASONE 6 MG: 4 TABLET ORAL at 08:11

## 2018-01-01 RX ADMIN — ACETAMINOPHEN 650 MG: 325 TABLET, FILM COATED ORAL at 06:28

## 2018-01-01 RX ADMIN — ACETAMINOPHEN 650 MG: 325 TABLET, FILM COATED ORAL at 21:07

## 2018-01-01 RX ADMIN — MORPHINE SULFATE 2 MG: 2 INJECTION, SOLUTION INTRAMUSCULAR; INTRAVENOUS at 07:43

## 2018-01-01 RX ADMIN — LABETALOL HYDROCHLORIDE 5 MG: 5 INJECTION, SOLUTION INTRAVENOUS at 15:23

## 2018-01-01 RX ADMIN — SODIUM CHLORIDE, POTASSIUM CHLORIDE, SODIUM LACTATE AND CALCIUM CHLORIDE 9 ML/HR: 600; 310; 30; 20 INJECTION, SOLUTION INTRAVENOUS at 12:38

## 2018-01-01 RX ADMIN — ACETAMINOPHEN 650 MG: 325 TABLET, FILM COATED ORAL at 21:43

## 2018-01-01 RX ADMIN — SERTRALINE 25 MG: 25 TABLET, FILM COATED ORAL at 08:33

## 2018-01-01 RX ADMIN — ENOXAPARIN SODIUM 30 MG: 30 INJECTION SUBCUTANEOUS at 14:38

## 2018-01-01 RX ADMIN — SENNOSIDES AND DOCUSATE SODIUM 2 TABLET: 8.6; 5 TABLET ORAL at 21:07

## 2018-01-01 RX ADMIN — LIDOCAINE HYDROCHLORIDE 60 MG: 20 INJECTION, SOLUTION INFILTRATION; PERINEURAL at 13:06

## 2018-01-01 RX ADMIN — ONDANSETRON 4 MG: 4 TABLET, FILM COATED ORAL at 08:52

## 2018-01-01 RX ADMIN — DEXAMETHASONE 6 MG: 4 TABLET ORAL at 08:32

## 2018-01-01 RX ADMIN — LEVOTHYROXINE SODIUM 125 MCG: 125 TABLET ORAL at 06:26

## 2018-01-01 RX ADMIN — ACETAMINOPHEN 650 MG: 325 TABLET, FILM COATED ORAL at 14:38

## 2018-01-01 RX ADMIN — SODIUM CHLORIDE 750 MG: 900 INJECTION, SOLUTION INTRAVENOUS at 11:57

## 2018-01-01 RX ADMIN — LABETALOL HYDROCHLORIDE 5 MG: 5 INJECTION, SOLUTION INTRAVENOUS at 14:59

## 2018-01-01 RX ADMIN — FENTANYL CITRATE 25 MCG: 50 INJECTION, SOLUTION INTRAMUSCULAR; INTRAVENOUS at 15:15

## 2018-01-01 RX ADMIN — FENTANYL CITRATE 25 MCG: 50 INJECTION INTRAMUSCULAR; INTRAVENOUS at 13:50

## 2018-01-01 RX ADMIN — ONDANSETRON 4 MG: 4 TABLET, FILM COATED ORAL at 18:43

## 2018-01-01 RX ADMIN — DEXAMETHASONE SODIUM PHOSPHATE 8 MG: 10 INJECTION INTRAMUSCULAR; INTRAVENOUS at 13:09

## 2018-01-01 RX ADMIN — ACETAMINOPHEN 650 MG: 325 TABLET, FILM COATED ORAL at 09:34

## 2018-01-01 RX ADMIN — ENOXAPARIN SODIUM 30 MG: 30 INJECTION SUBCUTANEOUS at 15:30

## 2018-01-01 RX ADMIN — MORPHINE SULFATE 2 MG: 2 INJECTION, SOLUTION INTRAMUSCULAR; INTRAVENOUS at 17:20

## 2018-01-01 RX ADMIN — SENNOSIDES AND DOCUSATE SODIUM 2 TABLET: 8.6; 5 TABLET ORAL at 08:33

## 2018-01-01 RX ADMIN — ACETAMINOPHEN 650 MG: 325 TABLET, FILM COATED ORAL at 14:04

## 2018-01-01 RX ADMIN — Medication: at 20:18

## 2018-01-01 RX ADMIN — LEVOTHYROXINE SODIUM 125 MCG: 125 TABLET ORAL at 06:28

## 2018-01-01 RX ADMIN — BISACODYL 10 MG: 10 SUPPOSITORY RECTAL at 08:11

## 2018-01-01 RX ADMIN — MORPHINE SULFATE 2 MG: 2 INJECTION, SOLUTION INTRAMUSCULAR; INTRAVENOUS at 11:21

## 2018-01-01 RX ADMIN — FAMOTIDINE 20 MG: 10 INJECTION, SOLUTION INTRAVENOUS at 12:48

## 2018-01-01 RX ADMIN — DEXAMETHASONE 6 MG: 4 TABLET ORAL at 09:08

## 2018-01-01 RX ADMIN — MORPHINE SULFATE 2 MG: 2 INJECTION, SOLUTION INTRAMUSCULAR; INTRAVENOUS at 11:01

## 2018-01-01 RX ADMIN — PROPOFOL 80 MG: 10 INJECTION, EMULSION INTRAVENOUS at 13:06

## 2018-01-01 RX ADMIN — VANCOMYCIN HYDROCHLORIDE 1 G: 1 INJECTION, SOLUTION INTRAVENOUS at 12:50

## 2018-01-01 RX ADMIN — FENTANYL CITRATE 25 MCG: 50 INJECTION INTRAMUSCULAR; INTRAVENOUS at 13:33

## 2018-01-01 RX ADMIN — Medication: at 09:30

## 2018-11-13 PROBLEM — M80.00XA OSTEOPOROSIS WITH PATHOLOGICAL FRACTURE: Status: ACTIVE | Noted: 2018-01-01

## 2018-11-13 PROBLEM — S72.145A CLOSED NONDISPLACED INTERTROCHANTERIC FRACTURE OF LEFT FEMUR (HCC): Status: ACTIVE | Noted: 2018-01-01

## 2018-11-13 NOTE — ED PROVIDER NOTES
" EMERGENCY DEPARTMENT ENCOUNTER    CHIEF COMPLAINT  Chief Complaint: hip injury  History given by: patient, daughter  History limited by: nothng  Room Number: 36/36  PMD: Jesus Velázquez Jr., MD      HPI:  Pt is a 89 y.o. female who presents complaining of left hip pain s/p fall at home around 1100 yesterday. Pt states that she was walking across the floor when she \"just dropped\". Pt states that she landed on her left side and did hit her head. Pt denies LOC.  Pt's daughter states that the pt had XRs done at home yesterday that showed that she fractured her L hip.    Duration:  One day  Onset: sudden  Timing: constant  Location: left hip  Radiation: none  Quality: \"pain\"  Intensity/Severity: moderate to severe  Progression: unchanged  Associated Symptoms: blow to the head  Aggravating Factors: movement  Alleviating Factors: none  Previous Episodes: none mentioned  Treatment before arrival: Pt's daughter states that the pt had XRs done at home yesterday that showed that she fractured her L hip.    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   • Primary cancer of left upper lobe of lung (CMS/HCC) 06/30/2015   • Peritoneal carcinoma (CMS/HCC) 09/20/2017   • Paroxysmal atrial fibrillation (CMS/HCC) 11/10/2017   • Mitral regurgitation 11/10/2017   • Mild pulmonary hypertension (CMS/HCC) 12/08/2013   • Metastatic cancer (CMS/HCC) 08/10/2016   • History of deep vein thrombosis (DVT) of lower extremity 11/10/2017   • History of non-ST elevation myocardial infarction (NSTEMI) 12/22/2016   • Hydroureteronephrosis 10/13/2017   • Hypothyroidism 11/10/2017   • Hyperlipidemia LDL goal <70 11/10/2017     Resolved Ambulatory Problems     Diagnosis Date Noted   • No Resolved " Ambulatory Problems     Past Medical History:   Diagnosis Date   • Atrial fibrillation (CMS/[facility])    • Diabetes (CMS/[facility])    • DVT (deep venous thrombosis) (CMS/[facility])    • Hyperlipidemia    • Hypertension    • Lung cancer (CMS/[facility]) 2015   • Ovarian cancer (CMS/[facility]) 2013   • Shingles    • Ureter obstruction 10/28/2013   • Vaginal stenosis 2014       PAST SURGICAL HISTORY  Past Surgical History:   Procedure Laterality Date   • COLONOSCOPY     • CYSTOSTOMY W/ STENT INSERTION     • HYSTERECTOMY  2013   • LUNG BIOPSY  2015   • LYMPH NODE BIOPSY Right 2013    Iliac, Carter Scott M.d.   • MAMMO BILATERAL  10/26/2011    Itz Recio II, MD       FAMILY HISTORY  Family History   Problem Relation Age of Onset   • Breast cancer Mother 65   • Colon polyps Sister    • Pneumonia Sister    • Esophageal cancer Brother    • Stroke Maternal Grandmother    • Breast cancer Daughter 58       SOCIAL HISTORY  Social History     Socioeconomic History   • Marital status:      Spouse name: Not on file   • Number of children: Not on file   • Years of education: Not on file   • Highest education level: Not on file   Social Needs   • Financial resource strain: Not on file   • Food insecurity - worry: Not on file   • Food insecurity - inability: Not on file   • Transportation needs - medical: Not on file   • Transportation needs - non-medical: Not on file   Occupational History   • Not on file   Tobacco Use   • Smoking status: Former Smoker     Last attempt to quit: 1977     Years since quittin.8   • Smokeless tobacco: Never Used   Substance and Sexual Activity   • Alcohol use: No   • Drug use: No   • Sexual activity: Not on file   Other Topics Concern   • Not on file   Social History Narrative   • Not on file       ALLERGIES  Codeine; Penicillins; and Sulfa antibiotics    REVIEW OF SYSTEMS  Review of Systems   Constitutional: Negative for fever.   HENT: Negative for sore throat.    Eyes:  Negative.    Respiratory: Negative for cough and shortness of breath.    Cardiovascular: Negative for chest pain.   Gastrointestinal: Negative for abdominal pain, diarrhea and vomiting.   Genitourinary: Negative for dysuria.   Musculoskeletal: Positive for arthralgias (L hip). Negative for neck pain.   Skin: Negative for rash.   Allergic/Immunologic: Negative.    Neurological: Negative for weakness, numbness and headaches.   Hematological: Negative.    Psychiatric/Behavioral: Negative.    All other systems reviewed and are negative.      PHYSICAL EXAM  ED Triage Vitals [11/13/18 1206]   Temp Heart Rate Resp BP SpO2   98.3 °F (36.8 °C) 120 -- 180/92 94 %      Temp src Heart Rate Source Patient Position BP Location FiO2 (%)   Tympanic -- -- -- --       Physical Exam   Constitutional: She is oriented to person, place, and time. No distress.   Pt appears elderly   HENT:   Head: Normocephalic. Head is with abrasion (lateral left eyebrow).   Eyes: EOM are normal. Pupils are equal, round, and reactive to light.   Neck: Normal range of motion. Neck supple. No spinous process tenderness and no muscular tenderness present. Normal range of motion present.   Cardiovascular: Normal heart sounds. An irregularly irregular rhythm present. Tachycardia present.   Pulmonary/Chest: Effort normal and breath sounds normal. No respiratory distress.   Abdominal: Soft. There is no tenderness. There is no rebound and no guarding.   Musculoskeletal: She exhibits no edema.        Right shoulder: She exhibits normal range of motion and no tenderness.        Left shoulder: She exhibits normal range of motion and no tenderness.        Right hip: She exhibits normal range of motion and no tenderness.        Left hip: She exhibits decreased range of motion (due to pain) and tenderness (greater trochanter).        Left upper leg: She exhibits no tenderness.   Pt's LLE is externally rotated   Neurological: She is alert and oriented to person, place,  and time. She has normal sensation and normal strength.   Skin: Skin is warm and dry. No rash noted.   Psychiatric: Mood and affect normal.   Nursing note and vitals reviewed.      LAB RESULTS  Lab Results (last 24 hours)     Procedure Component Value Units Date/Time    CBC & Differential [[id number]] Collected:  11/13/18 1253    Specimen:  Blood Updated:  11/13/18 1315    Narrative:       The following orders were created for panel order CBC & Differential.  Procedure                               Abnormality         Status                     ---------                               -----------         ------                     CBC Auto Differential[[id number]]        Abnormal            Final result                 Please view results for these tests on the individual orders.    Basic Metabolic Panel [[id number]]  (Abnormal) Collected:  11/13/18 1253    Specimen:  Blood Updated:  11/13/18 1326     Glucose 123 mg/dL      BUN 26 mg/dL      Creatinine 1.06 mg/dL      Sodium 133 mmol/L      Potassium 4.2 mmol/L      Chloride 97 mmol/L      CO2 25.3 mmol/L      Calcium 8.4 mg/dL      eGFR Non African Amer 49 mL/min/1.73      BUN/Creatinine Ratio 24.5     Anion Gap 10.7 mmol/L     Narrative:       The MDRD GFR formula is only valid for adults with stable renal function between ages 18 and 70.    CBC Auto Differential [[id number]]  (Abnormal) Collected:  11/13/18 1253    Specimen:  Blood Updated:  11/13/18 1315     WBC 19.61 10*3/mm3      RBC 3.22 10*6/mm3      Hemoglobin 9.7 g/dL      Hematocrit 31.7 %      MCV 98.4 fL      MCH 30.1 pg      MCHC 30.6 g/dL      RDW 14.7 %      RDW-SD 53.8 fl      MPV 8.2 fL      Platelets 175 10*3/mm3      Neutrophil % 89.7 %      Lymphocyte % 5.6 %      Monocyte % 3.9 %      Eosinophil % 0.1 %      Basophil % 0.1 %      Immature Grans % 0.6 %      Neutrophils, Absolute 17.62 10*3/mm3      Lymphocytes, Absolute 1.10 10*3/mm3      Monocytes, Absolute 0.76 10*3/mm3      Eosinophils, Absolute  0.01 10*3/mm3      Basophils, Absolute 0.01 10*3/mm3      Immature Grans, Absolute 0.11 10*3/mm3           I ordered the above labs and reviewed the results    RADIOLOGY  CT Head Without Contrast   Preliminary Result   No evidence of fracture or hemorrhage.               Radiation dose reduction techniques were utilized, including automated   exposure control and exposure modulation based on body size.              XR Hip With or Without Pelvis 2 - 3 View Left   Final Result   1. Comminuted intertrochanteric left proximal femur fracture without   evidence of underlying pathologic lesion.   2. No acute abnormality identified on chest x-ray. There is a Mediport   catheter and there is an almost 5 cm area of soft tissue opacity the   left lung apex. Reportedly the patient has known lung cancer.       This report was finalized on 11/13/2018 1:53 PM by Dr. Zackery Dean M.D.          XR Chest 1 View   Final Result   1. Comminuted intertrochanteric left proximal femur fracture without   evidence of underlying pathologic lesion.   2. No acute abnormality identified on chest x-ray. There is a Mediport   catheter and there is an almost 5 cm area of soft tissue opacity the   left lung apex. Reportedly the patient has known lung cancer.       This report was finalized on 11/13/2018 1:53 PM by Dr. Zackery Dean M.D.             I ordered the above noted radiological studies. Interpreted by radiologist. Reviewed by me in PACS.       PROCEDURES  Procedures      PROGRESS AND CONSULTS     1220- Ordered blood work, Type and Screen, CXR, CT Head and L hip XR for further evaluation.    1228- Rechecked pt. Pt is resting comfortably. Notified pt and daughter that the pt does have an intertrochanteric femur fracture on the pt's home XRs but that I do have to redo the pt's imaging studies. D/w pt and family that the pt does need to have surgery to repair the fracture. Pt's daughter clarified that the pt has been using a walker to  ambulate over the last month. Discussed the plan to order lab and imaging studies prior to calling orthopedic surgery. Pt understands and agrees with the plan, all questions answered.    1333- Placed call to [facility] for admission and to orthopedic surgery on call.    1403- Discussed the pt's case with Dr. Simmons ([facility]), who agrees to admit the pt to a Med/Surg bed.     1409- Discussed the pt's case with Dr. Oh (ortho) in the ED, who requests that I order a CT LLE. He agrees to consult.    1434- Rechecked pt. Pt is resting comfortably. Notified pt and family of the pt's lab and imaging results. Discussed the plan to admit the pt for further evaluation and orthopedic surgery consult. Pt and family agree with the plan and all questions were addressed.    1436- Ordered CT LLE per Dr. Oh.    MEDICAL DECISION MAKING  Results were reviewed/discussed with the patient and they were also made aware of online access. Pt also made aware that some labs, such as cultures, will not be resulted during ER visit and follow up with PMD is necessary.     MDM  Number of Diagnoses or Management Options  Closed head injury, initial encounter:   Closed nondisplaced intertrochanteric fracture of left femur, initial encounter (CMS/[facility]):   Fall, initial encounter:      Amount and/or Complexity of Data Reviewed  Clinical lab tests: ordered and reviewed (WBC=19.61, Hgb=9.7)  Tests in the radiology section of CPT®: ordered and reviewed (L hip XR shows a comminuted left intertrochanteric femur fracture)  Decide to obtain previous medical records or to obtain history from someone other than the patient: yes  Obtain history from someone other than the patient: yes (daughter)  Review and summarize past medical records: yes (Pt is a DNR)  Discuss the patient with other providers: yes (Dr. Simmons ([facility]), Dr. Oh (ortho))  Independent visualization of images, tracings, or specimens: yes           DIAGNOSIS  Final diagnoses:   Closed nondisplaced  intertrochanteric fracture of left femur, initial encounter (CMS/[facility])   Closed head injury, initial encounter   Fall, initial encounter       DISPOSITION  ADMISSION    Discussed treatment plan and reason for admission with pt/family and admitting physician.  Pt/family voiced understanding of the plan for admission for further testing/treatment as needed.     Latest Documented Vital Signs:  As of 2:03 PM  BP- 180/92 HR- 120 Temp- 98.3 °F (36.8 °C) (Tympanic) O2 sat- 94%    --  Documentation assistance provided by roberth Montez for Dr. Graham.  Information recorded by the roberth was done at my direction and has been verified and validated by me.     Lois Montez  11/13/18 1768       Haider Graham MD  11/14/18 3476

## 2018-11-13 NOTE — PROGRESS NOTES
Discharge Planning Assessment  [facility]     Patient Name: Kathryn Robison  MRN: [id number]  Today's Date: 11/13/2018    Admit Date: 11/13/2018    Discharge Needs Assessment     Row Name 11/13/18 1426       Living Environment    Lives With  alone    Unique Family Situation  Patient lives alone, yet has several assisting at this time w/patient needs    Current Living Arrangements  home/apartment/condo    Primary Care Provided by  self;child(chad)    Provides Primary Care For  no one, unable/limited ability to care for self    Caregiving Concerns  s/p hip fracture- pending surgery vs home    Family Caregiver if Needed  other (see comments)    Family Caregiver Names  See above daughters    Quality of Family Relationships  involved;helpful;supportive    Able to Return to Prior Arrangements  yes    Living Arrangement Comments  If patient d/c home will need increase care for ADL's       Transition Planning    Patient/Family Anticipated Services at Transition  ;community agency Hospice    Transportation Anticipated  health plan transportation       Discharge Needs Assessment    Concerns to be Addressed  discharge planning;home safety    Equipment Currently Used at Home  walker, rolling    Anticipated Changes Related to Illness  inability to care for self    Equipment Needed After Discharge  -- may need hospital bed, BSC, etc...    Offered/Gave Vendor List  no    Current Discharge Risk  dependent with mobility/activities of daily living;lives alone;physical impairment;terminally ill    Discharge Coordination/Progress  Awaiting plan of care- ortho to consult        Discharge Plan    No documentation.       Destination      No service coordination in this encounter.      Durable Medical Equipment      No service coordination in this encounter.      Dialysis/Infusion      No service coordination in this encounter.      Home Medical Care      No service coordination in this encounter.      Community Resources       No service coordination in this encounter.          Demographic Summary     Row Name 11/13/18 1423       General Information    Admission Type  -- ED patient    Arrived From  home patient is current with Hospice    Referral Source  interdisciplinary rounds    Reason for Consult  discharge planning    Preferred Language  English     Used During This Interaction  no       Contact Information    Permission Granted to Share Info With  ;family/designee    Contact Information Obtained for      Contact Information Comments  Adolfo Clemons @bedside [phone]; other daughters- Yumiko Underwood- [phone]; Zabrina Miguel- [phone]        Functional Status     Row Name 11/13/18 1425       Functional Status    Usual Activity Tolerance  fair    Current Activity Tolerance  poor    Functional Status Comments  Patient was ambulatory w/assist of walker until fall yesterday       Functional Status, IADL    Medications  independent    Meal Preparation  assistive person;independent    Housekeeping  assistive person    Laundry  assistive person    Shopping  assistive person    IADL Comments  Family assists patient w/ADL's; Hospice nurse visits       Mental Status    General Appearance WDL  WDL       Mental Status Summary    Recent Changes in Mental Status/Cognitive Functioning  hearing       Employment/    Employment Status  retired        Psychosocial    No documentation.       Abuse/Neglect    No documentation.       Legal    No documentation.       Substance Abuse    No documentation.       Patient Forms    No documentation.           Mar Ornelas RN

## 2018-11-13 NOTE — ED TRIAGE NOTES
Pt from home. Pt had xray done at home yesterday that showed hip fracture. Home [facility] sent pt to ER for eval of options for fracture. Pt in [facility] care due to Ovarian cancer. Pt fell at home yesterday

## 2018-11-13 NOTE — PLAN OF CARE
Problem: Patient Care Overview  Goal: Plan of Care Review  Outcome: Ongoing (interventions implemented as appropriate)   11/13/18 4679   Coping/Psychosocial   Plan of Care Reviewed With patient   Plan of Care Review   Progress no change   OTHER   Outcome Summary Pt admitted from ER post fall this morning. L femur fx. Incontience, in a brief. Voiding adequately. Morphine for pain. Ortho consulted, sx scheduled for tomorrow. Will cont to monitor       Problem: Fall Risk (Adult)  Goal: Absence of Fall  Outcome: Ongoing (interventions implemented as appropriate)      Problem: Skin Injury Risk (Adult)  Goal: Skin Health and Integrity  Outcome: Ongoing (interventions implemented as appropriate)      Problem: Fractured Hip (Adult)  Goal: Signs and Symptoms of Listed Potential Problems Will be Absent, Minimized or Managed (Fractured Hip)  Outcome: Ongoing (interventions implemented as appropriate)

## 2018-11-13 NOTE — CONSULTS
ORTHOPAEDIC SURGERY CONSULT NOTE  HPI:  Patient is a 89 y.o. Not  or  female who presents with hip pain after a fall from standing.  They presented to the ER for further workup where a left Intertrochanteric Hip Fracture was found. I was consulted for further management.  She has a history of known metastatic breast cancer with lesions in the long and pelvis, however no known bony metastases.  She also has atrial fibrillation for which she does not take any blood thinner medication.      Past Medical History:   Diagnosis Date   • Atrial fibrillation (CMS/HCC)    • Diabetes (CMS/HCC)    • DVT (deep venous thrombosis) (CMS/HCC)    • Hyperlipidemia    • Hypertension    • Lung cancer (CMS/HCC) 06/30/2015   • Ovarian cancer (CMS/HCC) 09/03/2013   • Shingles    • Ureter obstruction 10/28/2013   • Vaginal stenosis 03/03/2014     Past Surgical History:   Procedure Laterality Date   • COLONOSCOPY     • CYSTOSTOMY W/ STENT INSERTION     • HYSTERECTOMY  09/2013   • LUNG BIOPSY  06/26/2015   • LYMPH NODE BIOPSY Right 08/16/2013    Iliac, Carter Scott M.d.   • MAMMO BILATERAL  10/26/2011    Itz Recio II, MD     Prior to Admission medications    Medication Sig Start Date End Date Taking? Authorizing Provider   amiodarone (PACERONE) 200 MG tablet Take 200 mg by mouth. 12/24/16   Milton Villalobos MD   apixaban (ELIQUIS) 2.5 MG tablet tablet Take 2.5 mg by mouth. 12/24/16   Milton Villalobos MD   BYSTOLIC 20 MG tablet TAKE 1 TABLET DAILY 5/25/17   Jesus Velázquez Jr., MD   calcium-vitamin D (OSCAL 500/200 D-3) 500-200 MG-UNIT per tablet Take 1 tablet by mouth daily.    Milton Villalobos MD   cetirizine (zyrTEC) 10 MG tablet Take 0.5 tablets by mouth Every Night. 4/17/17   Kassandra Gillespie APRN   diphenhydrAMINE (BENADRYL) 25 mg Take 25 mg by mouth Every 6 (Six) Hours As Needed for itching.    Milton Villalobos MD   Docusate Sodium (DSS) 100 MG capsule Take 100 mg by mouth. 1/6/14   " Milton Villalobos MD   furosemide (LASIX) 20 MG tablet Take 20 mg by mouth. 16   Milton Villalobos MD   levothyroxine (SYNTHROID) 125 MCG tablet Take 1 tablet by mouth Daily. 18   Jesus Velázquez Jr., MD   losartan (COZAAR) 25 MG tablet Take 1 tablet by mouth Daily. 3/23/17   Jesus Velázquez Jr., MD   Prenatal Vit-Fe Fumarate-FA (PRENATAL 27-) 27-1 MG tablet tablet Take 1 tablet by mouth Daily. 17   Jesus Velázquez Jr., MD   rosuvastatin (CRESTOR) 5 MG tablet Take 1 tablet by mouth Daily. 3/23/17   Jesus Velázquez Jr., MD   sertraline (ZOLOFT) 25 MG tablet TAKE 1 TABLET DAILY 17   Jesus Velázquez Jr., MD   spironolactone (ALDACTONE) 25 MG tablet Take 25 mg by mouth. 17   Milton Villalobos MD     Allergies   Allergen Reactions   • Codeine    • Penicillins    • Sulfa Antibiotics      Most Recent Immunizations   Administered Date(s) Administered   • Flu Vaccine High Dose PF 65YR+ 10/04/2017   • Flu Vaccine Split Quad 10/22/2014   • Influenza TIV (IM) 10/01/2015   • Pneumococcal Conjugate 13-Valent (PCV13) 10/03/2016   • Tdap 2017     Social History     Tobacco Use   • Smoking status: Former Smoker     Last attempt to quit: 1977     Years since quittin.8   • Smokeless tobacco: Never Used   Substance Use Topics   • Alcohol use: No      Social History     Substance and Sexual Activity   Drug Use No     REVIEW OF SYSTEMS:  Head: negative for headache  Respiratory: negative for shortness of breath.   Cardiovascular: negative for chest pain.   Gastrointestinal: negative abdominal pain.   Neurological: negative for LOC  Psychiatric/Behavioral: negative for memory loss.   All other systems reviewed and are negative  VITALS: /83   Pulse 96   Temp 98.3 °F (36.8 °C) (Tympanic)   Resp 18   Ht 160 cm (63\")   Wt 43.1 kg (95 lb)   SpO2 94%   BMI 16.83 kg/m²  Body mass index is 16.83 kg/m².  EXAM:   CONSTITUTIONAL: A&Ox3, No acute distress  LUNGS: Equal chest " rise, no shortness of air  CARDIOVASCULAR: palpable peripheral pulses  SKIN: no skin lesions in the area examined  LYMPH: no lymphadenopathy in the area examined  EXTREMITY: Left Lower Extremity   Tenderness to Palpation: Tenderness to palpation at the hip   Pulses:  Palpable DP/PT pulses   Sensation: Sensation intact to light touch to saphenous/sural/deep peroneal/superficial peroneal/tibial nerves   Motor: 5 out of 5 EHL/FHL/TA/GS motor complexes   Range of Motion: Range of motion of hip deferred secondary to pain.  Positive pain with passive leg roll    DATA REVIEW:   Ct Head Without Contrast    Result Date: 11/13/2018  No evidence of fracture or hemorrhage.    Radiation dose reduction techniques were utilized, including automated exposure control and exposure modulation based on body size.       Xr Chest 1 View    Result Date: 11/13/2018  1. Comminuted intertrochanteric left proximal femur fracture without evidence of underlying pathologic lesion. 2. No acute abnormality identified on chest x-ray. There is a Mediport catheter and there is an almost 5 cm area of soft tissue opacity the left lung apex. Reportedly the patient has known lung cancer.  This report was finalized on 11/13/2018 1:53 PM by Dr. Zackery Dean M.D.      Xr Hip With Or Without Pelvis 2 - 3 View Left    Result Date: 11/13/2018  1. Comminuted intertrochanteric left proximal femur fracture without evidence of underlying pathologic lesion. 2. No acute abnormality identified on chest x-ray. There is a Mediport catheter and there is an almost 5 cm area of soft tissue opacity the left lung apex. Reportedly the patient has known lung cancer.  This report was finalized on 11/13/2018 1:53 PM by Dr. Zackery Dean M.D.      Labs:   Results for the past 24 hours:   Recent Results (from the past 24 hour(s))   Basic Metabolic Panel    Collection Time: 11/13/18 12:53 PM   Result Value Ref Range    Glucose 123 (H) 65 - 99 mg/dL    BUN 26 (H) 8 - 23 mg/dL     Creatinine 1.06 (H) 0.57 - 1.00 mg/dL    Sodium 133 (L) 136 - 145 mmol/L    Potassium 4.2 3.5 - 5.2 mmol/L    Chloride 97 (L) 98 - 107 mmol/L    CO2 25.3 22.0 - 29.0 mmol/L    Calcium 8.4 8.2 - 9.6 mg/dL    eGFR Non African Amer 49 (L) >60 mL/min/1.73    BUN/Creatinine Ratio 24.5 7.0 - 25.0    Anion Gap 10.7 mmol/L   CBC Auto Differential    Collection Time: 11/13/18 12:53 PM   Result Value Ref Range    WBC 19.61 (H) 4.50 - 10.70 10*3/mm3    RBC 3.22 (L) 3.90 - 5.20 10*6/mm3    Hemoglobin 9.7 (L) 11.9 - 15.5 g/dL    Hematocrit 31.7 (L) 35.6 - 45.5 %    MCV 98.4 (H) 80.5 - 98.2 fL    MCH 30.1 26.9 - 32.0 pg    MCHC 30.6 (L) 32.4 - 36.3 g/dL    RDW 14.7 (H) 11.7 - 13.0 %    RDW-SD 53.8 37.0 - 54.0 fl    MPV 8.2 6.0 - 12.0 fL    Platelets 175 140 - 500 10*3/mm3    Neutrophil % 89.7 (H) 42.7 - 76.0 %    Lymphocyte % 5.6 (L) 19.6 - 45.3 %    Monocyte % 3.9 (L) 5.0 - 12.0 %    Eosinophil % 0.1 (L) 0.3 - 6.2 %    Basophil % 0.1 0.0 - 1.5 %    Immature Grans % 0.6 (H) 0.0 - 0.5 %    Neutrophils, Absolute 17.62 (H) 1.90 - 8.10 10*3/mm3    Lymphocytes, Absolute 1.10 0.90 - 4.80 10*3/mm3    Monocytes, Absolute 0.76 0.20 - 1.20 10*3/mm3    Eosinophils, Absolute 0.01 0.00 - 0.70 10*3/mm3    Basophils, Absolute 0.01 0.00 - 0.20 10*3/mm3    Immature Grans, Absolute 0.11 (H) 0.00 - 0.03 10*3/mm3   Light Blue Top    Collection Time: 11/13/18 12:54 PM   Result Value Ref Range    Extra Tube hold for add-on    Gold Top - SST    Collection Time: 11/13/18 12:54 PM   Result Value Ref Range    Extra Tube Hold for add-ons.              IMPRESSION:  Patient is a 89 y.o. Not  or  female with left Intertrochanteric Hip Fracture  PLAN:   - Admited to: Louis Simmons MD  - Diet: NPO after midnight, Regular for Now  - Weight Bearing:Left Lower Extremity Non Weight Bearing  - Labs: None additional needed  - Imaging: CT Scan of Femur to look for possible metastatic lesions  - Surgery: Intramedullary nailing for intertrochanteric hip  fracture  - Consent: The risks and benefits of operative versus nonoperative treatment were discussed.  The patient elected to undergo operative treatment of their injury.  The risks discussed included but were not limited to blood clots, MI, stroke, other medical complications, infection, damage to neurovascular structures,, malunion, nonunion,, hardware prominence,, loss of range of motion, stiffness,, need for further procedures, and and risk of anesthesia..  No guarantees were made   - Disposition: I will plan to do a short intramedullary nail tomorrow pending OR availability.  I'm going to get a CT scan of the femur to make sure that this is not a metastatic lesion in that there are no metastatic lesions in the femur itself.  If there are, I may treat her with a long intramedullary nail.    Kamar Oh II, MD  Orthopaedic Surgery  [facility]

## 2018-11-13 NOTE — ED NOTES
Pt was walking across the floor this am when she fell. Pt had xray done at home and was diagnosed with hip fx. Pt was sent in by hospice. Pt has abrasion to left side of her face. External rotation. Pedal pulse strong     Gloria Acosta, RN  11/13/18 [id number]

## 2018-11-13 NOTE — H&P
Patient Name:  Kathryn Robison  YOB: 1926  MRN:  [id number]  Admit Date:  11/13/2018  Patient Care Team:  Jesus Velázquez Jr., MD as PCP - General (Internal Medicine)  Prabhjot Henriquez MD as PCP - Claims Attributed  Prabhjot Henriquez MD as Consulting Physician (Hematology and Oncology)      Chief Complaint   Patient presents with   • Hip Injury     Subjective   Ms. Robison is a 89 y.o. former smoker with a history of stage 4 ovarian cancer, afib, HTN/HLD and prior DVT who is admitted with left hip pain. Onset of symptoms was today after she sustained a fall in her home while walking in the kitchen. She reports that she did not trip over anything. No dizziness. Not really sure how she fell. Does not recall any pain in her hip prior to the fall. Did not hit her head or lose consciousness. It is described as an aching pain that is severe in intensity. It is made worse by movement and palpation. Relieved with rest and pain medication. She has had no prior hip problems. She currently denies any chest pain, palpitations, SOA or signs/symptoms of decompensated CHF. Prior cardiac history includes atrial fibrillation      History of Present Illness    Past Medical History:   Diagnosis Date   • Atrial fibrillation (CMS/HCC)    • Diabetes (CMS/HCC)    • DVT (deep venous thrombosis) (CMS/HCC)    • Hyperlipidemia    • Hypertension    • Lung cancer (CMS/HCC) 06/30/2015   • Ovarian cancer (CMS/HCC) 09/03/2013   • Shingles    • Ureter obstruction 10/28/2013   • Vaginal stenosis 03/03/2014     Past Surgical History:   Procedure Laterality Date   • COLONOSCOPY     • CYSTOSTOMY W/ STENT INSERTION     • HYSTERECTOMY  09/2013   • LUNG BIOPSY  06/26/2015   • LYMPH NODE BIOPSY Right 08/16/2013    Carter Davila M.d.   • MAMMO BILATERAL  10/26/2011    Itz Recio II, MD     Family History   Problem Relation Age of Onset   • Breast cancer Mother 65   • Colon polyps Sister    • Pneumonia Sister     • Esophageal cancer Brother    • Stroke Maternal Grandmother    • Breast cancer Daughter 58     Social History     Tobacco Use   • Smoking status: Former Smoker     Last attempt to quit: 1977     Years since quittin.8   • Smokeless tobacco: Never Used   Substance Use Topics   • Alcohol use: No   • Drug use: No     Medications Prior to Admission   Medication Sig Dispense Refill Last Dose   • acetaminophen (TYLENOL) 500 MG tablet Take 500 mg by mouth Every 6 (Six) Hours As Needed for Mild Pain .      • BISACODYL LAXATIVE RE Insert 10 mg into the rectum Daily.      • dexamethasone (DECADRON) 6 MG tablet Take 6 mg by mouth Daily With Breakfast.      • Dimethicone-Zinc Oxide (SOOTHE & COOL INZO BARRIER EX) Apply 1 application topically As Needed.      • hydrocortisone 1 % cream Apply 1 application topically to the appropriate area as directed Take As Directed.      • levothyroxine (SYNTHROID) 125 MCG tablet Take 1 tablet by mouth Daily. 90 tablet 1    • Lidocaine 2 % gel Apply 1 application topically 4 (Four) Times a Day As Needed.      • Loratadine 10 MG capsule Take 10 mg by mouth Daily.      • morphine 100 MG/5ML solution concentrated solution Take 5 mg by mouth Every 1 (One) Hour As Needed.      • ondansetron (ZOFRAN) 8 MG tablet Take  by mouth Every 6 (Six) Hours As Needed for Nausea or Vomiting.      • senna-docusate (TGT SENNA LAX/STOOL SOFTENER) 8.6-50 MG per tablet Take 2 tablets by mouth 2 (Two) Times a Day.      • sertraline (ZOLOFT) 25 MG tablet TAKE 1 TABLET DAILY 90 tablet 3      Allergies:    Allergies   Allergen Reactions   • Penicillins Hives   • Codeine    • Sulfa Antibiotics        Review of Systems   Constitutional: Negative.    HENT: Negative.    Eyes: Negative.    Respiratory: Negative.    Cardiovascular: Negative.    Gastrointestinal: Negative.    Endocrine: Negative.    Genitourinary: Negative.    Musculoskeletal: Negative.         See above   Skin: Negative.    Neurological: Negative.     Hematological: Negative.    Psychiatric/Behavioral: Negative.         Objective    Vital Signs  Temp:  [98.2 °F (36.8 °C)-98.3 °F (36.8 °C)] 98.2 °F (36.8 °C)  Heart Rate:  [] 113  Resp:  [18] 18  BP: (122-180)/(66-92) 122/66  SpO2:  [93 %-94 %] 93 %  on   ;   Device (Oxygen Therapy): room air  Body mass index is 16.83 kg/m².    Physical Exam   Constitutional: She is oriented to person, place, and time. No distress.   Frail, cachectic   HENT:   Head: Normocephalic and atraumatic.   Eyes: Conjunctivae and EOM are normal. No scleral icterus.   Neck: Normal range of motion. Neck supple. No JVD present. No tracheal deviation present.   Cardiovascular: Normal rate. An irregular rhythm present.   No murmur heard.  Pulmonary/Chest: Effort normal and breath sounds normal. She has no wheezes. She has no rales.   Abdominal: Soft. Bowel sounds are normal. She exhibits no distension and no mass. There is no tenderness.   Musculoskeletal: She exhibits edema (trace RLE) and deformity (LLE SER).   Neurological: She is alert and oriented to person, place, and time. No cranial nerve deficit.   Skin: Skin is warm and dry. No rash noted. No erythema.   Psychiatric: She has a normal mood and affect. Her behavior is normal.   Nursing note and vitals reviewed.      Results Review:  I reviewed the patient's new clinical results.  I reviewed the patient's new imaging results and agree with the interpretation.  I reviewed the patient's other test results and agree with the interpretation  I personally viewed and interpreted the patient's EKG/Telemetry data  Discussed with ED provider.      Lab Results (last 24 hours)     Procedure Component Value Units Date/Time    CBC & Differential [[id number]] Collected:  11/13/18 1253    Specimen:  Blood Updated:  11/13/18 1315    Narrative:       The following orders were created for panel order CBC & Differential.  Procedure                               Abnormality         Status                      ---------                               -----------         ------                     CBC Auto Differential[[id number]]        Abnormal            Final result                 Please view results for these tests on the individual orders.    Basic Metabolic Panel [[id number]]  (Abnormal) Collected:  11/13/18 1253    Specimen:  Blood Updated:  11/13/18 1326     Glucose 123 mg/dL      BUN 26 mg/dL      Creatinine 1.06 mg/dL      Sodium 133 mmol/L      Potassium 4.2 mmol/L      Chloride 97 mmol/L      CO2 25.3 mmol/L      Calcium 8.4 mg/dL      eGFR Non African Amer 49 mL/min/1.73      BUN/Creatinine Ratio 24.5     Anion Gap 10.7 mmol/L     Narrative:       The MDRD GFR formula is only valid for adults with stable renal function between ages 18 and 70.    CBC Auto Differential [[id number]]  (Abnormal) Collected:  11/13/18 1253    Specimen:  Blood Updated:  11/13/18 1315     WBC 19.61 10*3/mm3      RBC 3.22 10*6/mm3      Hemoglobin 9.7 g/dL      Hematocrit 31.7 %      MCV 98.4 fL      MCH 30.1 pg      MCHC 30.6 g/dL      RDW 14.7 %      RDW-SD 53.8 fl      MPV 8.2 fL      Platelets 175 10*3/mm3      Neutrophil % 89.7 %      Lymphocyte % 5.6 %      Monocyte % 3.9 %      Eosinophil % 0.1 %      Basophil % 0.1 %      Immature Grans % 0.6 %      Neutrophils, Absolute 17.62 10*3/mm3      Lymphocytes, Absolute 1.10 10*3/mm3      Monocytes, Absolute 0.76 10*3/mm3      Eosinophils, Absolute 0.01 10*3/mm3      Basophils, Absolute 0.01 10*3/mm3      Immature Grans, Absolute 0.11 10*3/mm3           Imaging Results (last 24 hours)     Procedure Component Value Units Date/Time    CT Head Without Contrast [[id number]] Collected:  11/13/18 1402     Updated:  11/13/18 1402    Narrative:       CT HEAD WITHOUT CONTRAST     HISTORY: Fall, hit head, headache, confusion.     A noncontrasted CT examination the brain was performed.     FINDINGS: The brain ventricles are symmetrical. Vascular calcification  is noted. There is no evidence of  hemorrhage, hydrocephalus or of  abnormal extra-axial fluid. No focal area of decreased attenuation to  suggest acute infarction is identified. Bone windows showed no evidence  of a calvarial fracture. Mild to moderate vascular calcification  involving the carotid siphons are noted.       Impression:       No evidence of fracture or hemorrhage.           Radiation dose reduction techniques were utilized, including automated  exposure control and exposure modulation based on body size.          XR Hip With or Without Pelvis 2 - 3 View Left [[id number]] Collected:  11/13/18 1350     Updated:  11/13/18 1356    Narrative:       Single view chest x-ray, pelvis and left hip x-ray     HISTORY: Known lung cancer. Left hip pain after a fall.     TECHNIQUE: AP view the pelvis and 3 views of the left hip are provided.  A single view of the chest was also acquired and is correlated with a  chest x-ray from May 6, 2006. There is no more recent chest imaging for  comparison.     FINDINGS: The image of the chest shows abnormal opacity in the left  upper lung zone measuring about 5.5 cm long and 3.2 cm transverse. This  may represent the reportedly known tumor. There is a right Mediport  catheter. The cardiomediastinal contours appear within normal limits and  not Smillie change in comparison to the remote x-ray. Some interstitial  prominence is observed throughout the lungs but no other findings  suspicious for tumor is present. There is no evidence of pleural  effusion or pneumothorax.     Pelvis and left hip images show a comminuted intertrochanteric left  proximal femur fracture with proximal retraction of the lesser  trochanter. No underlying bone lesion is evident. The bones of the  pelvis are intact. The proximal right femur appears intact.       Impression:       1. Comminuted intertrochanteric left proximal femur fracture without  evidence of underlying pathologic lesion.  2. No acute abnormality identified on chest x-ray.  There is a Mediport  catheter and there is an almost 5 cm area of soft tissue opacity the  left lung apex. Reportedly the patient has known lung cancer.     This report was finalized on 11/13/2018 1:53 PM by Dr. Zackery Dean M.D.       XR Chest 1 View [[id number]] Collected:  11/13/18 1350     Updated:  11/13/18 1356    Narrative:       Single view chest x-ray, pelvis and left hip x-ray     HISTORY: Known lung cancer. Left hip pain after a fall.     TECHNIQUE: AP view the pelvis and 3 views of the left hip are provided.  A single view of the chest was also acquired and is correlated with a  chest x-ray from May 6, 2006. There is no more recent chest imaging for  comparison.     FINDINGS: The image of the chest shows abnormal opacity in the left  upper lung zone measuring about 5.5 cm long and 3.2 cm transverse. This  may represent the reportedly known tumor. There is a right Mediport  catheter. The cardiomediastinal contours appear within normal limits and  not Smillie change in comparison to the remote x-ray. Some interstitial  prominence is observed throughout the lungs but no other findings  suspicious for tumor is present. There is no evidence of pleural  effusion or pneumothorax.     Pelvis and left hip images show a comminuted intertrochanteric left  proximal femur fracture with proximal retraction of the lesser  trochanter. No underlying bone lesion is evident. The bones of the  pelvis are intact. The proximal right femur appears intact.       Impression:       1. Comminuted intertrochanteric left proximal femur fracture without  evidence of underlying pathologic lesion.  2. No acute abnormality identified on chest x-ray. There is a Mediport  catheter and there is an almost 5 cm area of soft tissue opacity the  left lung apex. Reportedly the patient has known lung cancer.     This report was finalized on 11/13/2018 1:53 PM by Dr. Zackery Dean M.D.             No orders to display     Assessment/Plan    Active Hospital Problems    Diagnosis Date Noted   • **Closed nondisplaced intertrochanteric fracture of left femur (CMS/HCC) [S72.145A] 11/13/2018   • Osteoporosis with pathological fracture [M80.00XA] 11/13/2018   • History of deep vein thrombosis (DVT) of lower extremity [Z86.718] 11/10/2017   • Hypothyroidism [E03.9] 11/10/2017   • Paroxysmal atrial fibrillation (CMS/HCC) [I48.0] 11/10/2017   • Peritoneal carcinoma (CMS/HCC) [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       Ms. Robison is a 89 y.o. former smoker with a history of stage 4 ovarian cancer, afib, HTN/HLD and prior DVT who is admitted with left hip fracture.    · Admit to orthopedic floor.  · Dr. Oh was contacted by the ED and has seen in consultation. CT ordered to eval possible bone metastasis and pathologic fracture.  · She certainly is at increased risk due to advanced age and comorbidities. No modifiable risk factors. Cleared for surgery. Monitor heart rate given history atrial fibrillation.  · NPO after midnight.  · Will ask CCP to evaluate as likely to need subacute rehab placement following surgery.      I discussed the patients findings and my recommendations with patient and family.      Louis Simmons MD  [facility]  11/13/18  4:44 PM

## 2018-11-14 PROBLEM — E44.0 MODERATE MALNUTRITION (HCC): Status: ACTIVE | Noted: 2018-01-01

## 2018-11-14 NOTE — PROGRESS NOTES
Name: Kathryn Robison ADMIT: 2018   : 1926  PCP: Jesus Velázquez Jr., MD    MRN: [id number] LOS: 1 days   AGE/SEX: 89 y.o. female  ROOM: [facility]   Subjective   Complains of pain otherwise doing okay.    Objective   Vital Signs  Temp:  [98.1 °F (36.7 °C)-98.4 °F (36.9 °C)] 98.4 °F (36.9 °C)  Heart Rate:  [] 101  Resp:  [14-18] 16  BP: (122-180)/(66-92) 171/83  SpO2:  [93 %-96 %] 96 %  on   ;   Device (Oxygen Therapy): room air  Body mass index is 16.83 kg/m².    Physical Exam   Constitutional: She is oriented to person, place, and time. She appears cachectic. No distress.   Cardiovascular: Normal rate. An irregularly irregular rhythm present.   No murmur heard.  Pulmonary/Chest: Effort normal and breath sounds normal.   Abdominal: Soft. Bowel sounds are normal. She exhibits no distension. There is no tenderness.   Musculoskeletal: Normal range of motion. She exhibits no edema.   Neurological: She is alert and oriented to person, place, and time.   Skin: Skin is warm and dry. She is not diaphoretic.       Results Review:       I reviewed the patient's new clinical results.  Results from last 7 days   Lab Units  18   0505  18   1253   WBC 10*3/mm3  16.45*  19.61*   HEMOGLOBIN g/dL  9.1*  9.7*   PLATELETS 10*3/mm3  163  175     Results from last 7 days   Lab Units  18   0505  18   1253   SODIUM mmol/L  133*  133*   POTASSIUM mmol/L  4.6  4.2   CHLORIDE mmol/L  97*  97*   CO2 mmol/L  25.5  25.3   BUN mg/dL  28*  26*   CREATININE mg/dL  1.05*  1.06*   GLUCOSE mg/dL  103*  123*   Estimated Creatinine Clearance: 23.3 mL/min (A) (by C-G formula based on SCr of 1.05 mg/dL (H)).    Results from last 7 days   Lab Units  18   0505  11/13/18   1253   CALCIUM mg/dL  8.5  8.4           bisacodyl 10 mg Rectal Daily   dexamethasone 6 mg Oral Daily With Breakfast   hydrocortisone 1 application Topical Take As Directed   levothyroxine 125 mcg Oral Daily   menthol-zinc oxide   Topical Q12H   sennosides-docusate sodium 2 tablet Oral BID   sertraline 25 mg Oral Daily      NPO Diet      Assessment/Plan      Active Hospital Problems    Diagnosis Date Noted   • **Closed nondisplaced intertrochanteric fracture of left femur (CMS/HCC) [S72.145A] 11/13/2018   • Osteoporosis with pathological fracture [M80.00XA] 11/13/2018   • History of deep vein thrombosis (DVT) of lower extremity [Z86.718] 11/10/2017   • Hypothyroidism [E03.9] 11/10/2017   • Paroxysmal atrial fibrillation (CMS/HCC) [I48.0] 11/10/2017   • Peritoneal carcinoma (CMS/HCC) [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         · Discussed with family at bedside.  · Physical therapy to ambulate.  · Continue pain control efforts.  · Will need SNU placement probably over the weekend.        Louis Simmons MD  [facility]  11/14/18  9:04 AM

## 2018-11-14 NOTE — ANESTHESIA PREPROCEDURE EVALUATION
Anesthesia Evaluation     Patient summary reviewed and Nursing notes reviewed                Airway   Mallampati: II  Neck ROM: limited  Small opening  Dental      Pulmonary    (+) a smoker Former, lung cancer,   Cardiovascular     ECG reviewed  Rhythm: irregular  Rate: normal    (+) hypertension, valvular problems/murmurs MR, dysrhythmias Atrial Fib, DVT, hyperlipidemia,       Neuro/Psych- negative ROS  GI/Hepatic/Renal/Endo    (+)   diabetes mellitus type 2, hypothyroidism,     Musculoskeletal (-) negative ROS    Abdominal    Substance History - negative use     OB/GYN negative ob/gyn ROS         Other      history of cancer                    Anesthesia Plan    ASA 3 - emergent     general   (Multiple samara operative risks including post op confusion  Memory poor   Lung cancer with in situ mediport  T&S  )  intravenous induction   Anesthetic plan, all risks, benefits, and alternatives have been provided, discussed and informed consent has been obtained with: patient.

## 2018-11-14 NOTE — ANESTHESIA POSTPROCEDURE EVALUATION
Patient: Kathryn Robison    Procedure Summary     Date:  11/14/18 Room / Location:  [facility] OR [address] [place] [address] MAIN OR    Anesthesia Start:  1258 Anesthesia Stop:  1418    Procedure:  FEMUR INTRAMEDULLARY NAILING/RODDING (Left Thigh) Diagnosis:       Closed nondisplaced intertrochanteric fracture of left femur, initial encounter (CMS/[facility])      (Closed nondisplaced intertrochanteric fracture of left femur, initial encounter (CMS/[facility]) [S72.145A])    Surgeon:  Kamar Oh II, MD Provider:  Chano Sahu MD    Anesthesia Type:  general ASA Status:  3 - Emergent          Anesthesia Type: general  Last vitals  BP   169/94 (11/14/18 1545)   Temp   36.8 °C (98.2 °F) (11/14/18 1414)   Pulse   85 (11/14/18 1545)   Resp   12 (11/14/18 1545)     SpO2   100 % (11/14/18 1545)     Post Anesthesia Care and Evaluation    Patient location during evaluation: PACU  Level of consciousness: awake  Anesthetic complications: No anesthetic complications

## 2018-11-14 NOTE — PROGRESS NOTES
Malnutrition Severity Assessment    Patient Name:  Kathryn Robison  YOB: 1926  MRN: [id number]  Admit Date:  11/13/2018    Patient meets criteria for : Moderate malnutrition    Moderate malnutrition. BMI-16.8, 77% IBW, 12% weight loss in the past 6 months.  From nutrition focused physical exam, mild muscle loss to temporalis, clavicle and acromion bone region.    Malnutrition Type: Social/Environmental Circumstance Malnutrition     Malnutrition Type (last 8 hours)      Malnutrition Severity Assessment     Row Name 11/14/18 1108       Malnutrition Severity Assessment    Malnutrition Type  Social/Environmental Circumstance Malnutrition    Row Name 11/14/18 1108       Physical Signs of Malnutrition (Social/Environmental)    Muscle Wasting  Mild    Fat Loss  Mild    Row Name 11/14/18 1108       Weight Status (Social/Environmental)    BMI  Mod (<17) BMI-16.8    %IBW  Mod (<80%) 77%    Weight Loss  Severe (>10% / 6 mo) 12% in the past 6 months    Row Name 11/14/18 1108       Energy Intake Status (Social/Environmental)    Energy Intake  Mod (<75% / > or equal to 3 mo)    Row Name 11/14/18 1108       Criteria Met (Must meet criteria for severity in at least 2 of these categories: M Wasting, Fat Loss, Fluid, Secondary Signs, Wt. Status, Intake)    Patient meets criteria for   Moderate malnutrition          Electronically signed by:  Fatuma Maza RD  11/14/18 11:13 AM

## 2018-11-14 NOTE — ANESTHESIA PROCEDURE NOTES
ANESTHESIA INTUBATION  Urgency: elective    Date/Time: 11/14/2018 1:07 PM  Airway not difficult    General Information and Staff    Patient location during procedure: OR  Anesthesiologist: Chano Sahu MD  CRNA: Desiree Mo CRNA    Indications and Patient Condition  Indications for airway management: airway protection    Preoxygenated: yes  Mask difficulty assessment: 0 - not attempted    Final Airway Details  Final airway type: supraglottic airway      Successful airway: classic  Size 3    Number of attempts at approach: 1    Additional Comments  Atraumatic. No dental damage noted.

## 2018-11-14 NOTE — PROGRESS NOTES
"Pharmacy to Dose Vancomycin    Patient: Kathryn Robison (P882/1, [id number]  LOS: 1 day )  Relevant clinical data and objective history reviewed:  89 y.o. female 160 cm (62.99\") 43.1 kg (95 lb 0 oz)  Body mass index is 16.83 kg/m².  she has a past medical history of Atrial fibrillation (CMS/[facility]), Diabetes (CMS/[facility]), DVT (deep venous thrombosis) (CMS/[facility]), Hyperlipidemia, Hypertension, Lung cancer (CMS/[facility]) (2015), Ovarian cancer (CMS/[facility]) (2013), Shingles, Ureter obstruction (10/28/2013), and Vaginal stenosis (2014).    Day #1  Duration: 24 hrs post op  Consult for Dr Adriano WILSON  Indication: post op (left femur fracture)  Current dose: 1000 mg IV q12h    Cultures: none    Other Abx: none    Results from last 7 days   Lab Units  18   0505  18   1253   WBC 10*3/mm3  16.45*  19.61*   HEMOGLOBIN g/dL  9.1*  9.7*   HEMATOCRIT %  29.1*  31.7*   PLATELETS 10*3/mm3  163  175     Temp (24hrs), Av.1 °F (36.7 °C), Min:97.5 °F (36.4 °C), Max:98.4 °F (36.9 °C)    Serum creatinine: 1.05 mg/dL (H) 18 0505  Estimated creatinine clearance: 23.3 mL/min (A)    Assessment/Plan:   - Received 1000 mg IV x1 at 1250 (23.2 mg/kg)  - Vancomycin 750 mg IV x1 dose 11/15 at 1200. (17.4 mg/kg)    - CBC/BMP in AM    Ken Weir, PharmD  18 4:55 PM    "

## 2018-11-14 NOTE — PLAN OF CARE
Problem: Patient Care Overview  Goal: Plan of Care Review   11/14/18 0831   OTHER   Outcome Summary NPO, possible surgery today, voiding with briefs, VSS, morphine for pain, medi-port accessed.      Goal: Individualization and Mutuality  Outcome: Ongoing (interventions implemented as appropriate)    Goal: Discharge Needs Assessment  Outcome: Ongoing (interventions implemented as appropriate)      Problem: Fall Risk (Adult)  Goal: Identify Related Risk Factors and Signs and Symptoms  Outcome: Ongoing (interventions implemented as appropriate)    Goal: Absence of Fall  Outcome: Ongoing (interventions implemented as appropriate)      Problem: Skin Injury Risk (Adult)  Goal: Skin Health and Integrity  Outcome: Ongoing (interventions implemented as appropriate)      Problem: Fractured Hip (Adult)  Goal: Signs and Symptoms of Listed Potential Problems Will be Absent, Minimized or Managed (Fractured Hip)  Outcome: Ongoing (interventions implemented as appropriate)

## 2018-11-14 NOTE — OP NOTE
HIP FRACTURE OPERATIVE NOTE  PATIENT NAME: Kathryn Robison  MRN: [id number]  ATTENDING: Louis Simmons MD  : 1926 AGE: 89 y.o. GENDER: female  DATE OF OPERATION: 2018 - 2018  PREOPERATIVE DIAGNOSIS: Closed nondisplaced intertrochanteric fracture of left femur, initial encounter (CMS/[facility]) [S72.145A]  POSTOPERATIVE DIAGNOSIS: Closed nondisplaced intertrochanteric fracture of left femur, initial encounter (CMS/[facility]) [S72.145A]  OPERATION PERFORMED: Procedure(s):  FEMUR INTRAMEDULLARY NAILING/RODDING  SURGEON: Kamar Oh MD  Circulator: Kely Du RN  Radiology Technologist: Nazia Hightower RRT  Scrub Person: Karley San  Vendor Representative: Baron Keegan; Lemuel Allison  ANESTHESIA: Gen.  ESTIMATED BLOOD LOSS: 100 mL  SPONGE AND NEEDLE COUNT: Correct  INDICATIONS: This patient was found to have an Intertrochanteric Hip Fracture hip after evaluation in the ER. An orthopaedic consult was placed for management. After a thorough medical workup and discussion of the surgical options, the patient was cleared and scheduled for ORIF of the operative hip.   COMPONENTS:   [employer] TriGen InterTAN nail: 10 mm x 18 cm, 130°   TriGen InterTAN lag screw: 11 mm x 90 mm  TriGen InterTAN compression screw: 85 mm  TriGen L-P screw: 5 mm x 30 mm  PERTINENT FINDINGS: Hip fracture  DETAILS OF PROCEDURE:   The patient was met in the preoperative area. The site was marked. The consent and H&P were reviewed. The patient was then wheeled back to the operative suite underwent anesthesia. The [place] table boots were secured to the patients’ feet using Coban for extra friction. The patient was moved onto the [place] table and secured in the supine position. The perineal post was inserted and the boots were secured into the leg holders. Excess hair about the operative hip was removed using surgical clippers. The operative area was marked out using two 10x15 drapes to include below the knee to above the  iliac crest. Surgical alcohol was used to thoroughly clean the entire operative extremity.    Before prepping of the extremity, the fracture was closed reduced using traction and internal/external rotation of the extremity as needed. Visualization of adequate reduction was obtained with fluoroscopy for both the AP and Lateral views.     The hip and leg was then prepped in the normal sterile fashion, which included Chloroprep and multiple layers of sterile drapes. The surgical incision was marked. A surgical timeout was performed in which administration of preoperative antibiotics and the surgical site were confirmed.    A small incision was made just proximal to the greater trochanter and a starting pin was drilled into the tip of the greater trochanter using fluoroscopy to confirm proper location on both the AP and Lateral views. The opening reamer was then used to open the canal down to the lesser trochanter, visualizing propper position again in the AP and Lateral views.     At this point it was clear that adequate reduction could not be achieved and maintained using closed reduction techniques alone. A small incision was made and a bone hook was used to reduce the fracture before insertion of the nail.     SHORT NAIL  A short nail was then inserted into the femur through the hole made by the opening reamer.  The lag screw and compression screw were placed into the femoral head after being drilled and measured, again using fluoroscopy to place the screws in optimal position within the femoral head on both the AP and Lateral views, close to center/center position. Traction was released at this point and the fracture was compressed through the nail. Finally the short nail was secured with one distal interlocking screw which was placed using the jig assembly for the short nail. Final X-Rays showed the fracture reduction to have been maintained and the implant in optimal position.     The wounds were irrigated and  closed with 2-0 Vicryl and staples.  The wound was injected with Marcaine.  A sterile dressing was then placed over the incisions. The patient was moved from the [facility] table to the [facility] where the boots were removed. The patient was taken to the recovery room in stable condition. There were no complications and the patient tolerated the procedure well.    Kamar Oh II, MD  Orthopaedic Surgery  [facility]

## 2018-11-14 NOTE — PROGRESS NOTES
Adult Nutrition  Assessment/PES    Patient Name:  Kathryn Robison  YOB: 1926  MRN: [id number]  Admit Date:  11/13/2018    Assessment Date:  11/14/2018    Nutrition assessment triggered by low BMI-16.8. Spoke with patient's daughter-about 13# weight loss in the past 6 months due to decreased po intake. Moderate malnutrition.  Provided nutrition therapy. NPO at this time for possible surgery. Encouraged adequate po intake once diet advanced and protein for healing. Agreed to protein shake daily.  RD to monitor/follow up per protocol.     Reason for Assessment     Row Name 11/14/18 1107          Reason for Assessment    Reason For Assessment  identified at risk by screening criteria     Diagnosis  -- Primary Problem:  Closed nondisplaced intertrochanteric fracture of left femur (CMS/HCC)      Identified At Risk by Screening Criteria low BMI-16.8           Anthropometrics     Row Name 11/14/18 1107          Usual Body Weight (UBW)    Weight Loss Time Frame  13# weight loss in 6 months         Body Mass Index (BMI)    BMI Assessment  BMI 16-16.9: protein-energy malnutrition grade II         Labs/Tests/Procedures/Meds     Row Name 11/14/18 1107          Labs/Procedures/Meds    Lab Results Reviewed  reviewed, pertinent        Diagnostic Tests/Procedures    Diagnostic Test/Procedure Reviewed  reviewed, pertinent        Medications    Pertinent Medications Reviewed  reviewed, pertinent         Physical Findings     Row Name 11/14/18 1107          Physical Findings    Overall Physical Appearance  loss of muscle mass;loss of subcutaneous fat;underweight B=21         Estimated/Assessed Needs     Row Name 11/14/18 1109          Calculation Measurements    Weight Used For Calculations  43.1 kg (95 lb 0.3 oz)        Estimated/Assessed Needs    Additional Documentation  KCAL/KG (Group);Protein Requirements (Group);Fluid Requirements (Group)        KCAL/KG                                                                 kcal/kg (Specify)  7866-1618        [place]-St. Jeor Equation    RMR ([place]-St. Jeor Equation)  810        Protein Requirements    Est Protein Requirement Amount (gms/kg)  1.2 gm protein     Estimated Protein Requirements (gms/day)  51.72        Fluid Requirements    Estimated Fluid Requirements (mL/day)  1100     RDA Method (mL)  1100     Madhavi-Segar Method (over 20 kg)  2362         Nutrition Prescription Ordered     Row Name 11/14/18 1108          Nutrition Prescription PO    Current PO Diet  NPO         Evaluation of Received Nutrient/Fluid Intake     Row Name 11/14/18 1108          Calculation Measurements    Weight Used For Calculations  43.1 kg (95 lb 0.3 oz)         Evaluation of Prescribed Nutrient/Fluid Intake     Row Name 11/14/18 1108          Calculation Measurements    Weight Used For Calculations  43.1 kg (95 lb 0.3 oz)         Malnutrition Severity Assessment     Row Name 11/14/18 1108          Malnutrition Severity Assessment    Malnutrition Type  Social/Environmental Circumstance Malnutrition        Physical Signs of Malnutrition (Social/Environmental)    Muscle Wasting  Mild     Fat Loss  Mild        Weight Status (Social/Environmental)    BMI  Mod (<17) BMI-16.8     %IBW  Mod (<80%) 77%     Weight Loss  Severe (>10% / 6 mo) 12% in the past 6 months        Energy Intake Status (Social/Environmental)    Energy Intake  Mod (<75% / > or equal to 3 mo)        Criteria Met (Must meet criteria for severity in at least 2 of these categories: M Wasting, Fat Loss, Fluid, Secondary Signs, Wt. Status, Intake)    Patient meets criteria for   Moderate malnutrition           Problem/Interventions:  Problem 1     Row Name 11/14/18 1109          Nutrition Diagnoses Problem 1    Problem 1  Malnutrition     Etiology (related to)  MNT for Treatment/Condition     Signs/Symptoms (evidenced by)  Unintended Weight Change     Unintended Weight Change  Loss     Number of Pounds Lost  13#     Weight loss time  period  6 months                 Intervention Goal     Row Name 11/14/18 1110          Intervention Goal    General  Maintain nutrition;Meet nutritional needs for age/condition     PO  Tolerate PO;Advance diet     Weight  Appropriate weight gain         Nutrition Intervention     Row Name 11/14/18 1110          Nutrition Intervention    RD/Tech Action  Follow Tx progress;Care plan reviewd;Interview for preference;Recommend/ordered;Supplement provided spoke with patient's daughter     Recommended/Ordered  Supplement         Nutrition Prescription     Row Name 11/14/18 1110          Nutrition Prescription PO    PO Prescription  Begin/change supplement     Supplement  PRO powder;Milkshake     Supplement Frequency  2 times a day     New PO Prescription Ordered?  Yes         Education/Evaluation     Row Name 11/14/18 1111          Education    Education  Will Instruct as appropriate        Monitor/Evaluation    Monitor  Per protocol           Electronically signed by:  Fatuma Maza RD  11/14/18 11:11 AM

## 2018-11-14 NOTE — PLAN OF CARE
Problem: Patient Care Overview  Goal: Plan of Care Review  Outcome: Ongoing (interventions implemented as appropriate)   11/14/18 1521   Coping/Psychosocial   Plan of Care Reviewed With patient   OTHER   Outcome Summary VSS. Pain relief w/ IV morphine. Medi-port flushed with good blood return noted. Incontinent, brief on. Taken to OR for surgery today.       Problem: Fractured Hip (Adult)  Goal: Signs and Symptoms of Listed Potential Problems Will be Absent, Minimized or Managed (Fractured Hip)  Outcome: Ongoing (interventions implemented as appropriate)   11/14/18 1521   Goal/Outcome Evaluation   Problems Assessed (Fractured Hip) all   Problems Present (Fractured Hip) pain

## 2018-11-15 NOTE — PLAN OF CARE
Problem: Patient Care Overview  Goal: Plan of Care Review   11/15/18 2923   OTHER   Outcome Summary Pt is s/p L hip IM nail and presents with pain, limited ROm and impared mobility. Pt would benefit from PT to address these impairments and will need SNF placement at d/c.

## 2018-11-15 NOTE — PLAN OF CARE
Problem: Patient Care Overview  Goal: Plan of Care Review  Outcome: Ongoing (interventions implemented as appropriate)    Goal: Individualization and Mutuality   11/14/18 0831 11/15/18 0831   Individualization   Patient Specific Goals (Include Timeframe) adequate pain control, maintain immobilization of left hip --    Patient Specific Interventions --  VSS, voiding, minimal pain, medi-port right chest, incontinent with briefs     Goal: Interprofessional Rounds/Family Conf  Outcome: Unable to achieve outcome(s) by discharge Date Met: 11/15/18      Problem: Fall Risk (Adult)  Goal: Identify Related Risk Factors and Signs and Symptoms  Outcome: Ongoing (interventions implemented as appropriate)    Goal: Absence of Fall  Outcome: Ongoing (interventions implemented as appropriate)      Problem: Skin Injury Risk (Adult)  Goal: Skin Health and Integrity  Outcome: Ongoing (interventions implemented as appropriate)      Problem: Fractured Hip (Adult)  Goal: Signs and Symptoms of Listed Potential Problems Will be Absent, Minimized or Managed (Fractured Hip)  Outcome: Ongoing (interventions implemented as appropriate)

## 2018-11-15 NOTE — PROGRESS NOTES
Name: Kathryn Robison ADMIT: 2018   : 1926  PCP: Jesus Velázquez Jr., MD    MRN: [id number] LOS: 2 days   AGE/SEX: 89 y.o. female  ROOM: [facility]   Subjective   Complains of pain otherwise doing okay.    Objective   Vital Signs  Temp:  [96.7 °F (35.9 °C)-98.4 °F (36.9 °C)] 97.7 °F (36.5 °C)  Heart Rate:  [] 85  Resp:  [12-16] 16  BP: (111-192)/() 131/61  SpO2:  [92 %-100 %] 92 %  on  Flow (L/min):  [2-4] 2;   Device (Oxygen Therapy): room air  Body mass index is 16.83 kg/m².    Physical Exam   Constitutional: She is oriented to person, place, and time. She appears cachectic. No distress.   Cardiovascular: Normal rate. An irregularly irregular rhythm present.   No murmur heard.  Pulmonary/Chest: Effort normal and breath sounds normal.   Abdominal: Soft. Bowel sounds are normal. She exhibits no distension. There is no tenderness.   Musculoskeletal: Normal range of motion. She exhibits no edema.   Neurological: She is alert and oriented to person, place, and time.   Skin: Skin is warm and dry. She is not diaphoretic.       Results Review:       I reviewed the patient's new clinical results.  Results from last 7 days   Lab Units  11/15/18   0530  18   0505  18   1253   WBC 10*3/mm3  16.07*  16.45*  19.61*   HEMOGLOBIN g/dL  8.0*  9.1*  9.7*   PLATELETS 10*3/mm3  158  163  175     Results from last 7 days   Lab Units  11/15/18   0530  18   0505  18   1253   SODIUM mmol/L  132*  133*  133*   POTASSIUM mmol/L  4.6  4.6  4.2   CHLORIDE mmol/L  98  97*  97*   CO2 mmol/L  26.3  25.5  25.3   BUN mg/dL  30*  28*  26*   CREATININE mg/dL  1.18*  1.05*  1.06*   GLUCOSE mg/dL  123*  103*  123*   Estimated Creatinine Clearance: 20.7 mL/min (A) (by C-G formula based on SCr of 1.18 mg/dL (H)).    Results from last 7 days   Lab Units  11/15/18   0530  18   0505  18   1253   CALCIUM mg/dL  8.4  8.5  8.4           bisacodyl 10 mg Rectal Daily   dexamethasone 6 mg Oral  Daily With Breakfast   hydrocortisone 1 application Topical Take As Directed   levothyroxine 125 mcg Oral Daily   menthol-zinc oxide  Topical Q12H   sennosides-docusate sodium 2 tablet Oral BID   sertraline 25 mg Oral Daily   vancomycin 750 mg Intravenous Once       lactated ringers 9 mL/hr Last Rate: 9 mL/hr (11/14/18 1238)   Pharmacy to dose vancomycin     Diet Regular      Assessment/Plan      Active Hospital Problems    Diagnosis Date Noted   • **Closed nondisplaced intertrochanteric fracture of left femur (CMS/HCC) [S72.145A] 11/13/2018   • Social/Environmental Circumstance Malnutrition (moderate) [E44.0] 11/14/2018   • Osteoporosis with pathological fracture [M80.00XA] 11/13/2018   • History of deep vein thrombosis (DVT) of lower extremity [Z86.718] 11/10/2017   • Hypothyroidism [E03.9] 11/10/2017   • Paroxysmal atrial fibrillation (CMS/HCC) [I48.0] 11/10/2017   • Peritoneal carcinoma (CMS/HCC) [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       · POD#1 FEMUR INTRAMEDULLARY NAILING/RODDING  · Expected drop in Hgb post fracture and surgery.  · Continue her home PO morphine for pain.  · DVT prophylaxis held today due to anemia per orthopedics.  · Suspect leukocytosis due to Decadron.  · Will need SNU placement probably over the weekend.      Louis Simmons MD  [facility]  11/15/18  8:47 AM

## 2018-11-15 NOTE — PROGRESS NOTES
ORTHOPAEDIC SURGERY DAILY PROGRESS NOTE  Patient is a 89 y.o. female who is 1 Day Post-Op s/p Procedure(s):  FEMUR INTRAMEDULLARY NAILING/RODDING     P882/1 Kathryn Robison Age:89 y.o. MRN:[id number]  Admitted: 11/13/2018  Overnight events: No acute overnight events.  Doing okay since surgery  Pain: Pain well controlled with current pain regiment.   Ambulation/Activity: Minimal activity since surgery   Vitals:  Vitals:    11/14/18 1921 11/14/18 2235 11/15/18 0356 11/15/18 0729   BP: 128/66 111/63 132/70 131/61   BP Location: Left arm Left arm Right arm Right arm   Patient Position: Lying Lying Lying Lying   Pulse: 83 75 61 85   Resp: 16 16 16 16   Temp: 96.7 °F (35.9 °C) 97 °F (36.1 °C) 97.3 °F (36.3 °C) 97.7 °F (36.5 °C)   TempSrc: Oral Oral Oral Oral   SpO2: 97% 95% 97% 92%   Weight:       Height:         Ins/Outs:  Last 24hrs    Intake/Output Summary (Last 24 hours) at 11/15/2018 0748  Last data filed at 11/14/2018 1418  Gross per 24 hour   Intake 600 ml   Output 100 ml   Net 500 ml     Physical Exam:  CONSTITUTIONAL: A&Ox3, No acute distress  LUNGS: Equal chest rise, no shortness of air  CARDIOVASCULAR: palpable peripheral pulses  WOUND: Some serosanguineous drainage 2 distal wounds  EXTREMITY: Left Lower Extremity   Pulses: brisk capillary refill intact   Sensation: Sensation intact to light touch to the saphenous/sural/tibial/deep peroneal/superficial peroneal nerves, and grossly throughout the extremity.   Motor: 5/5 EHL/FHL/TA/GS motor complexes    Labs:   Lab Results   Component Value Date    HGB 8.0 (L) 11/15/2018     Lab Results   Component Value Date    WBC 16.07 (H) 11/15/2018     Lab Results   Component Value Date    GLUCOSE 123 (H) 11/15/2018    CALCIUM 8.4 11/15/2018     (L) 11/15/2018    K 4.6 11/15/2018    CO2 26.3 11/15/2018    CL 98 11/15/2018    BUN 30 (H) 11/15/2018    CREATININE 1.18 (H) 11/15/2018    EGFRIFAFRI  09/16/2016      Comment:      <15 Indicative of kidney failure.     EGFRIFNONA 43 (L) 11/15/2018    BCR 25.4 (H) 11/15/2018    ANIONGAP 7.7 11/15/2018         Radiology: Ct Head Without Contrast    Result Date: 11/14/2018  No evidence of fracture or hemorrhage.    Radiation dose reduction techniques were utilized, including automated exposure control and exposure modulation based on body size.  This report was finalized on 11/14/2018 3:35 PM by Dr. Jason Evans M.D.      Xr Chest 1 View    Result Date: 11/13/2018  1. Comminuted intertrochanteric left proximal femur fracture without evidence of underlying pathologic lesion. 2. No acute abnormality identified on chest x-ray. There is a Mediport catheter and there is an almost 5 cm area of soft tissue opacity the left lung apex. Reportedly the patient has known lung cancer.  This report was finalized on 11/13/2018 1:53 PM by Dr. Zackery Dean M.D.      Ct Lower Extremity Left Without Contrast    Result Date: 11/14/2018  There appears to be soft tissue metastatic disease within the pelvis and possibly tumor involving the uterus. There are left inguinal lymph nodes that are abnormally enlarged. However, no osseous metastatic lesion is identified. There is a severely comminuted intertrochanteric left femur fracture.  This report was finalized on 11/14/2018 12:19 PM by Dr. Zackery Dean M.D.      Xr Hip With Or Without Pelvis 2 - 3 View Left    Result Date: 11/13/2018  1. Comminuted intertrochanteric left proximal femur fracture without evidence of underlying pathologic lesion. 2. No acute abnormality identified on chest x-ray. There is a Mediport catheter and there is an almost 5 cm area of soft tissue opacity the left lung apex. Reportedly the patient has known lung cancer.  This report was finalized on 11/13/2018 1:53 PM by Dr. Zackery Dean M.D.      Assessment: Patient is a 89 y.o. female who is 1 Day Post-Op s/p Procedure(s):  FEMUR INTRAMEDULLARY NAILING/RODDING   - Weight Bearing: Left Lower Extremity Weight Bearing As  Tolerated  - Labs: Above lab values review. Plan: Hemoglobin low at 8, we will hold DVT prophylaxis today.  Continue to monitor.  White count elevated at 16, likely reactionary from fracture and surgery.  - PT/OT: To Mobilize  - DVT PPX: We will hold today secondary to low hemoglobin  - Post-Op Xray: Hardware in good position. Acceptable bony reduction.    - Wounds: Distal wounds was some drainage, will get changed today.  - Dispo: Pending further rehabilitation.  She will need SNF placement in a few days once stable.      Kamar Oh II, MD  Orthopaedic Surgery  [facility]

## 2018-11-15 NOTE — DISCHARGE PLACEMENT REQUEST
"Catherine Garcia (89 y.o. Female)     Date of Birth Social Security Number Address Home Phone MRN    11/14/1926  [address] [address] [phone] [id number]    Christian Marital Status          Christianity        Admission Date Admission Type Admitting Provider Attending Provider Department, Room/Bed    11/13/18 Emergency Louis Simmons MD Ray, Jonathan, MD [address], P882/1    Discharge Date Discharge Disposition Discharge Destination                       Attending Provider:  Louis Simmons MD    Allergies:  Penicillins, Codeine, Sulfa Antibiotics    Isolation:  None   Infection:  None   Code Status:  No CPR    Ht:  160 cm (62.99\")   Wt:  43.1 kg (95 lb 0 oz)    Admission Cmt:  None   Principal Problem:  Closed nondisplaced intertrochanteric fracture of left femur (CMS/[facility]) [S72.145A]                 Active Insurance as of 11/13/2018     Primary Coverage     Payor Plan Insurance Group Employer/Plan Group    [facility] MEDICARE REPLACEMENT [place] [id number]     Payor Plan Address Payor Plan Phone Number Payor Plan Fax Number Effective Dates    [address] [address]   1/1/2017 - None Entered    [facility] [id number]       Subscriber Name Subscriber Birth Date Member ID       CATHERINE GARCIA 11/14/1926 [id number]                 Emergency Contacts      (Rel.) Home Phone Work Phone Mobile Phone    Zabrina Barry (Daughter) [phone] -- --    Yumiko Underwood (Daughter) -- -- --    Judy Clemons (Daughter) -- -- [phone]        "

## 2018-11-15 NOTE — PLAN OF CARE
Problem: Patient Care Overview  Goal: Plan of Care Review  Outcome: Ongoing (interventions implemented as appropriate)   11/15/18 1535   Coping/Psychosocial   Plan of Care Reviewed With patient   Plan of Care Review   Progress improving   OTHER   Outcome Summary p stood at bedside with PT. Confused with time and place. Medicated with IV pain med and po tylenol.        Problem: Fall Risk (Adult)  Goal: Identify Related Risk Factors and Signs and Symptoms  Outcome: Outcome(s) achieved Date Met: 11/15/18    Goal: Absence of Fall  Outcome: Ongoing (interventions implemented as appropriate)      Problem: Skin Injury Risk (Adult)  Goal: Identify Related Risk Factors and Signs and Symptoms  Outcome: Outcome(s) achieved Date Met: 11/15/18    Goal: Skin Health and Integrity  Outcome: Ongoing (interventions implemented as appropriate)

## 2018-11-15 NOTE — THERAPY EVALUATION
Acute Care - Physical Therapy Initial Evaluation  [facility]     Patient Name: Kathryn Robison  : 1926  MRN: [id number]  Today's Date: 11/15/2018                Admit Date: 2018    Visit Dx:     ICD-10-CM ICD-9-CM   1. Closed nondisplaced intertrochanteric fracture of left femur, initial encounter (CMS/[facility]) S72.145A 820.21   2. Closed head injury, initial encounter S09.90XA 959.01   3. Fall, initial encounter W19.XXXA E888.9     Patient Active Problem List   Diagnosis   • Hypertension   • Ovarian cancer (CMS/HCC)   • Lung cancer (CMS/HCC)   • Chronic diastolic heart failure (CMS/HCC)   • Elevated CA-125   • Vesicovaginal fistula   • Primary cancer of left upper lobe of lung (CMS/HCC)   • Peritoneal carcinoma (CMS/HCC)   • Paroxysmal atrial fibrillation (CMS/HCC)   • Mitral regurgitation   • Mild pulmonary hypertension (CMS/HCC)   • Metastatic cancer (CMS/HCC)   • History of deep vein thrombosis (DVT) of lower extremity   • History of non-ST elevation myocardial infarction (NSTEMI)   • Hydroureteronephrosis   • Hypothyroidism   • Hyperlipidemia LDL goal <70   • Closed nondisplaced intertrochanteric fracture of left femur (CMS/HCC)   • Osteoporosis with pathological fracture   • Social/Environmental Circumstance Malnutrition (moderate)     Past Medical History:   Diagnosis Date   • Atrial fibrillation (CMS/HCC)    • Diabetes (CMS/HCC)    • DVT (deep venous thrombosis) (CMS/HCC)    • Hyperlipidemia    • Hypertension    • Lung cancer (CMS/HCC) 2015   • Ovarian cancer (CMS/HCC) 2013   • Shingles    • Ureter obstruction 10/28/2013   • Vaginal stenosis 2014     Past Surgical History:   Procedure Laterality Date   • COLONOSCOPY     • CYSTOSTOMY W/ STENT INSERTION     • HYSTERECTOMY  2013   • LUNG BIOPSY  2015   • LYMPH NODE BIOPSY Right 2013    Iliac, Carter Scott M.d.   • MAMMO BILATERAL  10/26/2011    Itz Recio II, MD        PT ASSESSMENT (last 12 hours)       Physical Therapy Evaluation     [facility] Name 11/15/18 0942          PT Evaluation Time/Intention    Subjective Information  complains of;pain  -     Document Type  evaluation  -     Mode of Treatment  physical therapy  -     Patient Effort  adequate  -     Symptoms Noted During/After Treatment  increased pain  -[facility] Name 11/15/18 0942          General Information    Patient Observations  alert;cooperative;agree to therapy  -     General Observations of Patient  in bed, daughter present  -     Prior Level of Function  independent:  -     Equipment Currently Used at Home  walker, rolling  -     Pertinent History of Current Functional Problem  s/p L hip ORIF  -     Existing Precautions/Restrictions  fall  -[facility] Name 11/15/18 0942          Relationship/Environment    Lives With  alone  -[facility] Name 11/15/18 0942          Resource/Environmental Concerns    Current Living Arrangements  home/apartment/condo  -[facility] Name 11/15/18 0942          Cognitive Assessment/Interventions    Additional Documentation  Cognitive Assessment/Intervention (Group)  -[facility] Name 11/15/18 0942          Cognitive Assessment/Intervention- PT/OT    Affect/Mental Status (Cognitive)  WFL  -     Orientation Status (Cognition)  oriented x 3  -     Follows Commands (Cognition)  WNL  -     Personal Safety Interventions  fall prevention program maintained;gait belt;nonskid shoes/slippers when out of bed  -     Row Name 11/15/18 0942          Mobility Assessment/Treatment    Extremity Weight-bearing Status  left lower extremity  -     Left Lower Extremity (Weight-bearing Status)  weight-bearing as tolerated (WBAT)  -[facility] Name 11/15/18 0942          Bed Mobility Assessment/Treatment    Bed Mobility Assessment/Treatment  supine-sit;sit-supine  -     Supine-Sit [place] (Bed Mobility)  moderate assist (50% patient effort)  -     Sit-Supine [place] (Bed Mobility)  maximum assist (25%  patient effort)  -     Assistive Device (Bed Mobility)  bed rails;draw sheet;head of bed elevated  -KH     Row Name 11/15/18 0942          Transfer Assessment/Treatment    Transfer Assessment/Treatment  sit-stand transfer;stand-sit transfer  -     Comment (Transfers)  limited WB tolerance on LLE  -     Sit-Stand [place] (Transfers)  moderate assist (50% patient effort)  -     Stand-Sit [place] (Transfers)  moderate assist (50% patient effort)  -KH     Row Name 11/15/18 0942          Sit-Stand Transfer    Assistive Device (Sit-Stand Transfers)  walker, front-wheeled  -[facility] Name 11/15/18 0942          Stand-Sit Transfer    Assistive Device (Stand-Sit Transfers)  walker, front-wheeled  -KH     Row Name 11/15/18 0942          Gait/Stairs Assessment/Training    [place] Level (Gait)  unable to assess unable to tolerated WB to take steps  -KH     Row Name 11/15/18 0942          General ROM    GENERAL ROM COMMENTS  WFL  -[facility] 11/15/18 0942          MMT (Manual Muscle Testing)    General MMT Comments  [facility]  -[facility] 11/15/18 0942          Motor Assessment/Intervention    Additional Documentation  Therapeutic Exercise Interventions (Group)  -KH     Row Name 11/15/18 0942          Therapeutic Exercise    Comment (Therapeutic Exercise)  L hip protocol x 10 reps  -KH     Row Name 11/15/18 0942          Pain Assessment    Additional Documentation  Pain Scale: Numbers Pre/Post-Treatment (Group)  -KH     Row Name 11/15/18 0942          Pain Scale: Numbers Pre/Post-Treatment    Pain Scale: Numbers, Pretreatment  0/10 - no pain  -     Pain Scale: Numbers, Post-Treatment  5/10  -     Pain Location - Side  Left  -     Pain Location - Orientation  lower  -     Pain Location  extremity  -     Pain Intervention(s)  Cold applied;Repositioned  -KH     Row Name             Wound 11/14/18 1354 Left hip incision    Wound - Properties Group Date first assessed: 11/14/18  -EG Time first  assessed: 1354  -EG Side: Left  -EG Location: hip  -EG Type: incision  -EG    Row Name 11/15/18 0942          Plan of Care Review    Plan of Care Reviewed With  patient  -     Row Name 11/15/18 0942          Physical Therapy Clinical Impression    Patient/Family Goals Statement (PT Clinical Impression)  return to OF  -     Criteria for Skilled Interventions Met (PT Clinical Impression)  treatment indicated  -     Impairments Found (describe specific impairments)  gait, locomotion, and balance;ROM  -     Rehab Potential (PT Clinical Summary)  good, to achieve stated therapy goals  -     Row Name 11/15/18 0942          Physical Therapy Goals    Bed Mobility Goal Selection (PT)  bed mobility, PT goal 1  -     Transfer Goal Selection (PT)  transfer, PT goal 1  -     Gait Training Goal Selection (PT)  gait training, PT goal 1  -     Additional Documentation  --  -     Row Name 11/15/18 0942          Bed Mobility Goal 1 (PT)    Activity/Assistive Device (Bed Mobility Goal 1, PT)  bed mobility activities, all  -     [place] Level/Cues Needed (Bed Mobility Goal 1, PT)  minimum assist (75% or more patient effort)  -     Time Frame (Bed Mobility Goal 1, PT)  1 week  -[facility] Name 11/15/18 0942          Transfer Goal 1 (PT)    Activity/Assistive Device (Transfer Goal 1, PT)  transfers, all;walker, rolling  -KH     [place] Level/Cues Needed (Transfer Goal 1, PT)  contact guard assist  -     Time Frame (Transfer Goal 1, PT)  1 week  -     Row Name 11/15/18 0942          Gait Training Goal 1 (PT)    Activity/Assistive Device (Gait Training Goal 1, PT)  gait (walking locomotion);walker, rolling  -KH     [place] Level (Gait Training Goal 1, PT)  minimum assist (75% or more patient effort)  -     Distance (Gait Goal 1, PT)  25 ft  -     Time Frame (Gait Training Goal 1, PT)  1 week  -     Row Name 11/15/18 0942          Patient Education Goal (PT)    Activity (Patient Education  Goal, PT)  HEP  -     [place]/Cues/Accuracy (Memory Goal 2, PT)  demonstrates adequately  -     Time Frame (Patient Education Goal, PT)  1 week  -     Row Name 11/15/18 0942          Positioning and Restraints    Pre-Treatment Position  in bed  -     Post Treatment Position  bed  -     In Bed  notified nsg;fowlers;call light within reach;encouraged to call for assist;exit alarm on;with family/caregiver  -       User Key  (r) = Recorded By, (t) = Taken By, (c) = Cosigned By    Initials Name Provider Type    Ernestina Varela, PT Physical Therapist    Kely Sinclair RN Registered Nurse        Physical Therapy Education     Title: PT OT SLP Therapies (Active)     Topic: Physical Therapy (Active)     Point: Mobility training (Active)     Learning Progress Summary           Patient Acceptance, E,D, NR by  at 11/15/2018  9:49 AM                   Point: Home exercise program (Active)     Learning Progress Summary           Patient Acceptance, E,D, NR by  at 11/15/2018  9:49 AM                   Point: Body mechanics (Active)     Learning Progress Summary           Patient Acceptance, E,D, NR by  at 11/15/2018  9:49 AM                   Point: Precautions (Active)     Learning Progress Summary           Patient Acceptance, E,D, NR by  at 11/15/2018  9:49 AM                               User Key     Initials Effective Dates Name Provider Type [facility] 06/08/18 -  Ernestina Nice, PT Physical Therapist PT              PT Recommendation and Plan  Anticipated Discharge Disposition (PT): skilled nursing facility  Planned Therapy Interventions (PT Eval): bed mobility training, gait training, home exercise program, patient/family education, ROM (range of motion), strengthening, transfer training  Therapy Frequency (PT Clinical Impression): daily  Outcome Summary/Treatment Plan (PT)  Anticipated Discharge Disposition (PT): skilled nursing facility  Plan of Care Reviewed  With: patient  Outcome Summary: Pt is s/p L hip IM nail and presents with pain, limited ROm and impared mobility. Pt would benefit from PT to address these impairments and will need SNF placement at d/c.  Outcome Measures     Row Name 11/15/18 0900             How much help from another person do you currently need...    Turning from your back to your side while in flat bed without using bedrails?  2  -KH      Moving from lying on back to sitting on the side of a flat bed without bedrails?  2  -KH      Moving to and from a bed to a chair (including a wheelchair)?  2  -KH      Standing up from a chair using your arms (e.g., wheelchair, bedside chair)?  2  -KH      Climbing 3-5 steps with a railing?  1  -KH      To walk in hospital room?  1  -KH      AM-PAC 6 Clicks Score  10  -KH         Functional Assessment    Outcome Measure Options  AM-PAC 6 Clicks Basic Mobility (PT)  -        User Key  (r) = Recorded By, (t) = Taken By, (c) = Cosigned By    Initials Name Provider Type    Ernestina Varela PT Physical Therapist         Time Calculation:   PT Charges     Row Name 11/15/18 0941             Time Calculation    Start Time  0926  -      Stop Time  0941  -      Time Calculation (min)  15 min  -      PT Received On  11/15/18  -      PT - Next Appointment  11/16/18  -      PT Goal Re-Cert Due Date  11/22/18  -         Time Calculation- PT    Total Timed Code Minutes- PT  8 minute(s)  -        User Key  (r) = Recorded By, (t) = Taken By, (c) = Cosigned By    Initials Name Provider Type    Ernestina Varela PT Physical Therapist        Therapy Suggested Charges     Code   Minutes Charges    None           Therapy Charges for Today     Code Description Service Date Service Provider Modifiers Qty    61144854299 HC PT EVAL MOD COMPLEXITY 2 11/15/2018 Ernestnia Nice, PT GP 1    40318438823 HC PT THER PROC EA 15 MIN 11/15/2018 Ernestina Nice, PT GP 1          PT  G-Codes  Outcome Measure Options: AM-PAC 6 Clicks Basic Mobility (PT)  AM-PAC 6 Clicks Score: 10      Ernestina Nice, PT  11/15/2018

## 2018-11-15 NOTE — PROGRESS NOTES
Continued Stay Note  [facility]     Patient Name: Kathryn Robison  MRN: [id number]  Today's Date: 11/15/2018    Admit Date: 11/13/2018    Discharge Plan     Row Name 11/15/18 1333       Plan    Plan  [facility]    Patient/Family in Agreement with Plan  yes    Plan Comments  Spoke with pt and family, pt would like to d/c to [facility], left msg for Rajwinder Zepeda with [facility]. Awaiting call back with approval. CCP to follow. Likely d/c over the weekend.        Discharge Codes    No documentation.             Mayte Curtis RN

## 2018-11-16 NOTE — PROGRESS NOTES
Continued Stay Note  [facility]     Patient Name: Kathryn Robison  MRN: [id number]  Today's Date: 11/16/2018    Admit Date: 11/13/2018    Discharge Plan     Row Name 11/16/18 1256       Plan    Plan  [facility] auth notification only. [facility] arranged 11/17 at 11am.     Patient/Family in Agreement with Plan  yes    Plan Comments  CCP contacted by Rosi with [facility]. Pt is accepted and insurance requires auth notification only. Bed available 11/17. Discussed with daughter Marie at bedside, she is requesting EMS transportation. CCP provided disclaimer that payment of services by insurance could not be guaranteed. [facility] arranged for 11/17 at 11am. [facility] liaison notified.         Discharge Codes    No documentation.       Expected Discharge Date and Time     Expected Discharge Date Expected Discharge Time    Nov 17, 2018             PEGGY Smith

## 2018-11-16 NOTE — PROGRESS NOTES
ORTHOPAEDIC SURGERY DAILY PROGRESS NOTE  Patient is a 89 y.o. female who is 2 Days Post-Op s/p Procedure(s):  FEMUR INTRAMEDULLARY NAILING/RODDING     P882/1 Kathryn Robison Age:89 y.o. MRN:[id number]  Admitted: 11/13/2018  Overnight events: Overall doing okay, Slow to mobilize  Pain: Pain well controlled with current pain regiment.   Ambulation/Activity: Slow mobilization postoperatively  Vitals:  Vitals:    11/15/18 2250 11/16/18 0343 11/16/18 0735 11/16/18 1112   BP: 129/62 139/62 123/61 117/62   BP Location: Left arm Left arm Left arm Left arm   Patient Position: Lying Lying Lying Lying   Pulse: 88 82 79 86   Resp: 18 16 18 18   Temp: 99.3 °F (37.4 °C) 98.9 °F (37.2 °C) 98 °F (36.7 °C) 98 °F (36.7 °C)   TempSrc: Oral Oral Oral Oral   SpO2: 98% 95% 97% 97%   Weight:       Height:         Ins/Outs:  Last 24hrs    Intake/Output Summary (Last 24 hours) at 11/16/2018 1340  Last data filed at 11/16/2018 0930  Gross per 24 hour   Intake 315 ml   Output --   Net 315 ml     Physical Exam:  CONSTITUTIONAL: A&Ox3, No acute distress  LUNGS: Equal chest rise, no shortness of air  CARDIOVASCULAR: palpable peripheral pulses  WOUND: Minimal drainage  EXTREMITY: Left Lower Extremity   Pulses: brisk capillary refill intact   Sensation: Sensation intact to light touch to the saphenous/sural/tibial/deep peroneal/superficial peroneal nerves, and grossly throughout the extremity.   Motor: 5/5 EHL/FHL/TA/GS motor complexes    Labs:   Lab Results   Component Value Date    HGB 8.0 (L) 11/15/2018     Lab Results   Component Value Date    WBC 16.07 (H) 11/15/2018     Lab Results   Component Value Date    GLUCOSE 123 (H) 11/15/2018    CALCIUM 8.4 11/15/2018     (L) 11/15/2018    K 4.6 11/15/2018    CO2 26.3 11/15/2018    CL 98 11/15/2018    BUN 30 (H) 11/15/2018    CREATININE 1.18 (H) 11/15/2018    EGFRIFAFRI  09/16/2016      Comment:      <15 Indicative of kidney failure.    EGFRIFNONA 43 (L) 11/15/2018    BCR 25.4 (H) 11/15/2018     ANIONGAP 7.7 11/15/2018         Radiology: No radiology results for the last day  Assessment: Patient is a 89 y.o. female who is 2 Days Post-Op s/p Procedure(s):  FEMUR INTRAMEDULLARY NAILING/RODDING   - Weight Bearing: Left Lower Extremity Weight Bearing As Tolerated  - Labs: Above lab values review. Plan: No new hemoglobin value for today.  I'm going to start Lovenox 30 daily, but also want to recheck her hemoglobin today.  I'm also ordering a daily hemoglobin a.m. draw  - PT/OT: To Mobilize  - DVT PPX: Lovenox 30 daily  - Post-Op Xray: Hardware in good position. Acceptable bony reduction.    - Wounds: Distal wounds was some drainage, will get changed today.  - Dispo: SNF when cleared by medicine      Kamar Oh II, MD  Orthopaedic Surgery  [facility]

## 2018-11-16 NOTE — THERAPY TREATMENT NOTE
Acute Care - Physical Therapy Treatment Note  [facility]     Patient Name: Kathryn Robison  : 1926  MRN: [id number]  Today's Date: 2018             Admit Date: 2018    Visit Dx:    ICD-10-CM ICD-9-CM   1. Closed nondisplaced intertrochanteric fracture of left femur, initial encounter (CMS/[facility]) S72.145A 820.21   2. Closed head injury, initial encounter S09.90XA 959.01   3. Fall, initial encounter W19.XXXA E888.9     Patient Active Problem List   Diagnosis   • Hypertension   • Ovarian cancer (CMS/HCC)   • Lung cancer (CMS/HCC)   • Chronic diastolic heart failure (CMS/HCC)   • Elevated CA-125   • Vesicovaginal fistula   • Primary cancer of left upper lobe of lung (CMS/HCC)   • Peritoneal carcinoma (CMS/HCC)   • Paroxysmal atrial fibrillation (CMS/HCC)   • Mitral regurgitation   • Mild pulmonary hypertension (CMS/HCC)   • Metastatic cancer (CMS/HCC)   • History of deep vein thrombosis (DVT) of lower extremity   • History of non-ST elevation myocardial infarction (NSTEMI)   • Hydroureteronephrosis   • Hypothyroidism   • Hyperlipidemia LDL goal <70   • Closed nondisplaced intertrochanteric fracture of left femur (CMS/HCC)   • Osteoporosis with pathological fracture   • Social/Environmental Circumstance Malnutrition (moderate)       Therapy Treatment    Rehabilitation Treatment Summary     Row Name 18 1736             Treatment Time/Intention    Discipline  physical therapy assistant  -JOSE GUADALUPE      Document Type  therapy note (daily note)  -JOSE GUADALUPE      Subjective Information  complains of;weakness;fatigue;pain  -JOSE GUADALUPE      Care Plan Review  patient/other agree to care plan  -      Care Plan Review, Other Participant(s)  daughter  -      Comment  very frail, also [place]-Left ear best per dtr  (Significant)   -JOSE GUADALUPE      Existing Precautions/Restrictions  fall  -      Recorded by [JOSE GUADALUPE] Jazmin Allison, PTA 18 1748      Row Name 18 3153             Bed Mobility Assessment/Treatment     Supine-Sit [place] (Bed Mobility)  2 person assist;moderate assist (50% patient effort);maximum assist (25% patient effort)  -      Assistive Device (Bed Mobility)  draw sheet  -JM      Comment (Bed Mobility)  sat EOB prior to standing to get balance  -JM      Recorded by [JM] Jazmin Allison, [facility] 11/16/18 1741      Row Name 11/16/18 1736             Sit-Stand Transfer    Sit-Stand [place] (Transfers)  2 person assist;moderate assist (50% patient effort)  -      Assistive Device (Sit-Stand Transfers)  walker, front-wheeled  -JM      Recorded by [JM] Jazmin Allison, PTA 11/16/18 1741      Row Name 11/16/18 1736             Stand-Sit Transfer    Stand-Sit [place] (Transfers)  2 person assist;minimum assist (75% patient effort) cues to reach hand for recliner  -      Assistive Device (Stand-Sit Transfers)  walker, front-wheeled  -JM      Recorded by [JM] Jazmin Allison, [facility] 11/16/18 1741      Row Name 11/16/18 1736             Gait/Stairs Assessment/Training    [place] Level (Gait)  2 person assist;moderate assist (50% patient effort)  -      Assistive Device (Gait)  walker, front-wheeled  -JM      Distance in Feet (Gait)  10  -JM      Deviations/Abnormal Patterns (Gait)  ojhn decreased;festinating/shuffling;stride length decreased;antalgic;base of support, narrow  -JM      Bilateral Gait Deviations  -- flexed knees  -JM      Comment (Gait/Stairs)  assist to guide rwx  -JM      Recorded by [JM] Jazmin Allison, PTA 11/16/18 1741      Row Name 11/16/18 1736             Therapeutic Exercise    Comment (Therapeutic Exercise)  too fatigued after BM and clean-up to perf  -JM      Recorded by [JM] Jazmin Allison, PTA 11/16/18 1741      Row Name 11/16/18 1736             Positioning and Restraints    Pre-Treatment Position  in bed  -JM      Post Treatment Position  chair  -JM      In Chair  reclined;call light within reach;encouraged to call for assist;with family/caregiver;notified  nsg;LLE elevated  -      Recorded by [JM] Jazmin Allison, PTA 11/16/18 1741      Row Name 11/16/18 1736             Pain Scale: Numbers Pre/Post-Treatment    Pain Scale: Numbers, Pretreatment  4/10  -      Pain Scale: Numbers, Post-Treatment  5/10  -      Pain Location - Side  Left  -      Pain Location  hip pointed to incision, didn't actually say hip  -JM      Pain Intervention(s)  Medication (See MAR);Repositioned;Ambulation/increased activity  -      Recorded by [JM] Jazmin Allison, CARYN 11/16/18 1741      Row Name                Wound 11/14/18 1354 Left hip incision    Wound - Properties Group Date first assessed: 11/14/18 [EG] Time first assessed: 1354 [EG] Side: Left [EG] Location: hip [EG] Type: incision [EG] Recorded by:  [EG] Kely Du RN 11/14/18 1354      User Key  (r) = Recorded By, (t) = Taken By, (c) = Cosigned By    Initials Name Effective Dates Discipline     Jazmin Allison, CARYN 03/07/18 -  PT    EG Kely Du RN 07/10/17 -  Nurse          Wound 11/14/18 1354 Left hip incision (Active)   Dressing Appearance dry;intact;no drainage 11/16/2018  4:02 PM   Base dressing in place, unable to visualize 11/16/2018  4:02 PM   Drainage Characteristics/Odor serous 11/16/2018  8:10 AM   Drainage Amount none 11/16/2018  4:02 PM   Dressing Care, Wound gauze;transparent film 11/15/2018  8:15 PM           Physical Therapy Education     Title: PT OT SLP Therapies (Active)     Topic: Physical Therapy (Active)     Point: Mobility training (Active)     Learning Progress Summary           Patient Acceptance, E,D, NR by JOSE GUADALUPE at 11/16/2018  5:42 PM    Acceptance, E,D, NR by PATRICIA at 11/15/2018  9:49 AM   Family Acceptance, E,D, NR by JOSE GUADALUPE at 11/16/2018  5:42 PM                   Point: Home exercise program (Active)     Learning Progress Summary           Patient Acceptance, E,D, NR by JOSE GUADALUPE at 11/16/2018  5:42 PM    Acceptance, E,D, NR by PATRICIA at 11/15/2018  9:49 AM   Family Acceptance, E,D, NR by JOSE GUADALUPE  at 11/16/2018  5:42 PM                   Point: Body mechanics (Active)     Learning Progress Summary           Patient Acceptance, E,D, NR by  at 11/16/2018  5:42 PM    Acceptance, E,D, NR by PATRICIA at 11/15/2018  9:49 AM   Family Acceptance, E,D, NR by JOSE GUADALUPE at 11/16/2018  5:42 PM                   Point: Precautions (Active)     Learning Progress Summary           Patient Acceptance, E,D, NR by JOSE GUADALUPE at 11/16/2018  5:42 PM    Acceptance, E,D, NR by PATRICIA at 11/15/2018  9:49 AM   Family Acceptance, E,D, NR by JOSE GUADALUPE at 11/16/2018  5:42 PM                               User Key     Initials Effective Dates Name Provider Type Discipline     06/08/18 -  Ernestina Nice, PT Physical Therapist PT    JOSE GUADALUPE 03/07/18 -  Jazmin Allison PTA Physical Therapy Assistant PT                PT Recommendation and Plan     Plan of Care Reviewed With: patient, daughter  Outcome Summary: pt able to amb short dist w/assist of 2  Outcome Measures     Row Name 11/16/18 1700 11/15/18 0900          How much help from another person do you currently need...    Turning from your back to your side while in flat bed without using bedrails?  2  -  2  -     Moving from lying on back to sitting on the side of a flat bed without bedrails?  2  -  2  -KH     Moving to and from a bed to a chair (including a wheelchair)?  2  -  2  -     Standing up from a chair using your arms (e.g., wheelchair, bedside chair)?  2  -  2  -     Climbing 3-5 steps with a railing?  1  -  1  -     To walk in hospital room?  2  -  1  -KH     AM-PAC 6 Clicks Score  11  -  10  -        Functional Assessment    Outcome Measure Options  --  AM-PAC 6 Clicks Basic Mobility (PT)  -       User Key  (r) = Recorded By, (t) = Taken By, (c) = Cosigned By    Initials Name Provider Type    Ernestina Varela, PT Physical Therapist    Jazmin Starr PTA Physical Therapy Assistant         Time Calculation:   PT Charges     Row Name 11/16/18 1753              Time Calculation    Start Time  1702  -      Stop Time  1725  -      Time Calculation (min)  23 min  -      PT Received On  11/16/18  -JOSE GUADALUPE      PT - Next Appointment  11/17/18  -JOSE GUADALUPE         Time Calculation- PT    Total Timed Code Minutes- PT  23 minute(s)  -JOSE GUADALUPE        User Key  (r) = Recorded By, (t) = Taken By, (c) = Cosigned By    Initials Name Provider Type    Jazmin Starr PTA Physical Therapy Assistant        Therapy Suggested Charges     Code   Minutes Charges    None           Therapy Charges for Today     Code Description Service Date Service Provider Modifiers Qty    [id number] HC PT THER PROC EA 15 MIN 11/16/2018 Jazmin Allison PTA GP 2          PT G-Codes  Outcome Measure Options: AM-PAC 6 Clicks Basic Mobility (PT)  AM-PAC 6 Clicks Score: 11    Jazmin Allison PTA  11/16/2018

## 2018-11-16 NOTE — PLAN OF CARE
Problem: Patient Care Overview  Goal: Plan of Care Review   11/16/18 1617   Coping/Psychosocial   Plan of Care Reviewed With patient   Plan of Care Review   Progress improving   OTHER   Outcome Summary Pt more alert and oriented today. All IV meds discontinued. Port deaccessed and flushed with 5ml heparin 100units per protocal. BM x 3 after stool softners and supp. Skin care done to right ischeal area and samara area. Skin much improved. Turned q 2 hours. Expecting transfer to [facility] tomorrow. Pained control adequate with Tylenol       Problem: Fall Risk (Adult)  Goal: Absence of Fall  Outcome: Ongoing (interventions implemented as appropriate)      Problem: Skin Injury Risk (Adult)  Goal: Skin Health and Integrity  Outcome: Outcome(s) achieved Date Met: 11/16/18

## 2018-11-16 NOTE — PROGRESS NOTES
Continued Stay Note  [facility]     Patient Name: Kathryn Robison  MRN: [id number]  Today's Date: 11/16/2018    Admit Date: 11/13/2018    Discharge Plan     Row Name 11/16/18 0847       Plan    Plan  Referral to [facility], spoke with Rosi.     Patient/Family in Agreement with Plan  yes    Plan Comments  CCP received voicemail from Jeannette/Geovanni with [facility], they are out of network with pt's insurance. CCP met with pt and daughter Judy [phone] at bedside to discuss. 2nd choice is [facility], 3rd choice [facility]. Referral to [facility], spoke with Rosi. Need for precert versus auth notification only pending outcome of benefit check. -PEGGY Smith        Discharge Codes    No documentation.             PEGGY Smith

## 2018-11-16 NOTE — PROGRESS NOTES
Name: Kathryn Robison ADMIT: 2018   : 1926  PCP: Jesus Velázquez Jr., MD    MRN: [id number] LOS: 3 days   AGE/SEX: 89 y.o. female  ROOM: [facility]   Subjective   Is more comfortable with the pain. Tylenol working. Has been constipated but feels somewhat better after suppository and bowel movement earlier.    Objective   Vital Signs  Temp:  [97.9 °F (36.6 °C)-99.3 °F (37.4 °C)] 98 °F (36.7 °C)  Heart Rate:  [79-93] 79  Resp:  [16-18] 18  BP: (114-149)/(61-82) 123/61  SpO2:  [95 %-98 %] 97 %  on   ;   Device (Oxygen Therapy): room air  Body mass index is 16.83 kg/m².    Physical Exam   Constitutional: She is oriented to person, place, and time. She appears cachectic. No distress.   Cardiovascular: Normal rate. An irregularly irregular rhythm present.   No murmur heard.  Pulmonary/Chest: Effort normal and breath sounds normal.   Abdominal: Soft. Bowel sounds are normal. She exhibits no distension. There is no tenderness.   Musculoskeletal: Normal range of motion. She exhibits no edema.   Neurological: She is alert and oriented to person, place, and time.   Skin: Skin is warm and dry. She is not diaphoretic.       Results Review:       I reviewed the patient's new clinical results.  Results from last 7 days   Lab Units  11/15/18   0530  18   0505  18   1253   WBC 10*3/mm3  16.07*  16.45*  19.61*   HEMOGLOBIN g/dL  8.0*  9.1*  9.7*   PLATELETS 10*3/mm3  158  163  175     Results from last 7 days   Lab Units  11/15/18   0530  18   0505  18   1253   SODIUM mmol/L  132*  133*  133*   POTASSIUM mmol/L  4.6  4.6  4.2   CHLORIDE mmol/L  98  97*  97*   CO2 mmol/L  26.3  25.5  25.3   BUN mg/dL  30*  28*  26*   CREATININE mg/dL  1.18*  1.05*  1.06*   GLUCOSE mg/dL  123*  103*  123*   Estimated Creatinine Clearance: 20.7 mL/min (A) (by C-G formula based on SCr of 1.18 mg/dL (H)).    Results from last 7 days   Lab Units  11/15/18   0530  18   0505  18   1253   CALCIUM mg/dL   8.4  8.5  8.4           bisacodyl 10 mg Rectal Daily   dexamethasone 6 mg Oral Daily With Breakfast   hydrocortisone 1 application Topical Take As Directed   levothyroxine 125 mcg Oral Daily   menthol-zinc oxide  Topical Q12H   sennosides-docusate sodium 2 tablet Oral BID   sertraline 25 mg Oral Daily      Diet Regular      Assessment/Plan      Active Hospital Problems    Diagnosis Date Noted   • **Closed nondisplaced intertrochanteric fracture of left femur (CMS/HCC) [S72.145A] 11/13/2018   • Social/Environmental Circumstance Malnutrition (moderate) [E44.0] 11/14/2018   • Osteoporosis with pathological fracture [M80.00XA] 11/13/2018   • History of deep vein thrombosis (DVT) of lower extremity [Z86.718] 11/10/2017   • Hypothyroidism [E03.9] 11/10/2017   • Paroxysmal atrial fibrillation (CMS/HCC) [I48.0] 11/10/2017   • Peritoneal carcinoma (CMS/HCC) [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       · POD#2 FEMUR INTRAMEDULLARY NAILING/RODDING  · Expected drop in Hgb post fracture and surgery.  · Tylenol for pain.  · DVT prophylaxis held due to anemia  · Suspect leukocytosis due to Decadron.  · Continue senna, MOM, dulcolax etc. Enema available PRN.  · Plan DC to SNU in am.      Louis Simmons MD  [facility]  11/16/18  8:32 AM

## 2018-11-16 NOTE — PLAN OF CARE
Problem: Patient Care Overview  Goal: Plan of Care Review  Outcome: Ongoing (interventions implemented as appropriate)   11/16/18 8058   OTHER   Outcome Summary morphine causes confusion, does well with tylenol, VSS, incontinent,      Goal: Individualization and Mutuality  Outcome: Ongoing (interventions implemented as appropriate)      Problem: Fall Risk (Adult)  Goal: Absence of Fall  Outcome: Ongoing (interventions implemented as appropriate)      Problem: Skin Injury Risk (Adult)  Goal: Skin Health and Integrity  Outcome: Ongoing (interventions implemented as appropriate)      Problem: Fractured Hip (Adult)  Goal: Signs and Symptoms of Listed Potential Problems Will be Absent, Minimized or Managed (Fractured Hip)  Outcome: Ongoing (interventions implemented as appropriate)

## 2018-11-16 NOTE — PLAN OF CARE
Problem: Patient Care Overview  Goal: Plan of Care Review  Outcome: Ongoing (interventions implemented as appropriate)   11/16/18 6203   Coping/Psychosocial   Plan of Care Reviewed With patient;daughter   OTHER   Outcome Summary pt able to amb short dist w/assist of 2

## 2018-11-17 PROBLEM — D62 POSTOPERATIVE ANEMIA DUE TO ACUTE BLOOD LOSS: Status: ACTIVE | Noted: 2018-01-01

## 2018-11-17 NOTE — PROGRESS NOTES
ORTHOPAEDIC SURGERY DAILY PROGRESS NOTE  Patient is a 89 y.o. female who is 3 Days Post-Op s/p Procedure(s):  FEMUR INTRAMEDULLARY NAILING/RODDING     P882/1 Kathryn Robison Age:89 y.o. MRN:[id number]  Admitted: 11/13/2018  Overnight events: No new issues  Pain: Pain well controlled with current pain regiment.   Ambulation/Activity: Slow mobilization postoperatively  Vitals:  Vitals:    11/16/18 1525 11/16/18 1921 11/16/18 2342 11/17/18 0356   BP: 128/65 104/54 143/61 139/73   BP Location: Right arm Right arm Right arm Right arm   Patient Position: Lying Lying Lying Lying   Pulse: 94 87 90 87   Resp: 18 18 18 18   Temp: 98.5 °F (36.9 °C) 97 °F (36.1 °C) 97.1 °F (36.2 °C) 98 °F (36.7 °C)   TempSrc: Oral Oral Oral Oral   SpO2: 98% 99% 96% 90%   Weight:       Height:         Ins/Outs:  Last 24hrs    Intake/Output Summary (Last 24 hours) at 11/17/2018 0750  Last data filed at 11/16/2018 1321  Gross per 24 hour   Intake 120 ml   Output --   Net 120 ml     Physical Exam:  CONSTITUTIONAL: A&Ox3, No acute distress  LUNGS: Equal chest rise, no shortness of air  CARDIOVASCULAR: palpable peripheral pulses  WOUND: Minimal drainage  EXTREMITY: Left Lower Extremity   Pulses: brisk capillary refill intact   Sensation: Sensation intact to light touch to the saphenous/sural/tibial/deep peroneal/superficial peroneal nerves, and grossly throughout the extremity.   Motor: 5/5 EHL/FHL/TA/GS motor complexes    Labs:   Lab Results   Component Value Date    HGB 8.0 (L) 11/15/2018     Lab Results   Component Value Date    WBC 16.07 (H) 11/15/2018     Lab Results   Component Value Date    GLUCOSE 123 (H) 11/15/2018    CALCIUM 8.4 11/15/2018     (L) 11/15/2018    K 4.6 11/15/2018    CO2 26.3 11/15/2018    CL 98 11/15/2018    BUN 30 (H) 11/15/2018    CREATININE 1.18 (H) 11/15/2018    EGFRIFAFRI  09/16/2016      Comment:      <15 Indicative of kidney failure.    EGFRIFNONA 43 (L) 11/15/2018    BCR 25.4 (H) 11/15/2018    ANIONGAP 7.7  11/15/2018         Radiology: No radiology results for the last day  Assessment: Patient is a 89 y.o. female who is 3 Days Post-Op s/p Procedure(s):  FEMUR INTRAMEDULLARY NAILING/RODDING   - Weight Bearing: Left Lower Extremity Weight Bearing As Tolerated  - Labs: Above lab values review. Plan: No new labs for today  - PT/OT: To Mobilize  - DVT PPX: Lovenox 30 daily  - Post-Op Xray: Hardware in good position. Acceptable bony reduction.    - Wounds: SNF when bed available      Kamar Oh II, MD  Orthopaedic Surgery  [facility]

## 2018-11-17 NOTE — SIGNIFICANT NOTE
11/17/18 0844   PT Discharge Summary   Reason for Discharge Discharge from facility   Discharge Destination SNF

## 2018-11-17 NOTE — DISCHARGE SUMMARY
Name: Kathryn Robison  Age: 89 y.o.  Sex: female  :  1926  MRN: [id number]         Primary Care Physician: Jesus Velázquez Jr., MD      Date of Admission:  2018  Date of Discharge:  2018      Chief Complaint:  Hip Injury      Presenting Problem/History of Present Illness:  Closed head injury, initial encounter [S09.90XA]  Fall, initial encounter [W19.XXXA]  Closed nondisplaced intertrochanteric fracture of left femur, initial encounter (CMS/[facility]) [S72.145A]     Discharge Diagnosis:  Active Hospital Problems    Diagnosis Date Noted   • **Closed nondisplaced intertrochanteric fracture of left femur (CMS/[facility]) [S72.145A] 2018   • Postoperative anemia due to acute blood loss [D62] 2018   • Social/Environmental Circumstance Malnutrition (moderate) [E44.0] 2018   • Osteoporosis with pathological fracture [M80.00XA] 2018   • History of deep vein thrombosis (DVT) of lower extremity [Z86.718] 11/10/2017   • Hypothyroidism [E03.9] 11/10/2017   • Paroxysmal atrial fibrillation (CMS/HCC) [I48.0] 11/10/2017   • Peritoneal carcinoma (CMS/HCC) [C48.2] 2017   • Chronic diastolic heart failure (CMS/HCC) [I50.32] 2017   • Hypertension [I10] 2017   • History of non-ST elevation myocardial infarction (NSTEMI) [I25.2] 2016   • Metastatic cancer (CMS/HCC) [C79.9] 08/10/2016   • Ovarian cancer (CMS/HCC) [C56.9] 2013      Resolved Hospital Problems   No resolved problems to display.       Secondary Diagnoses:  Past Medical History:   Diagnosis Date   • Atrial fibrillation (CMS/HCC)    • Diabetes (CMS/HCC)    • DVT (deep venous thrombosis) (CMS/HCC)    • Hyperlipidemia    • Hypertension    • Lung cancer (CMS/HCC) 2015   • Ovarian cancer (CMS/HCC) 2013   • Shingles    • Ureter obstruction 10/28/2013   • Vaginal stenosis 2014         Consults:   Start        18   Specialty:  Orthopedic Surgery  Provider:  Kamar Oh II,  MD             Procedures Performed:  FEMUR INTRAMEDULLARY NAILING/RODDING  CT HIP - no bony mets to suggest pathologic fracture      Hospital Course:  Ms. Robison is a 89 y.o. female former smoker with a history of stage IV ovarian cancer who presented to [facility] initially complaining of left hip pain after sustaining a fall in her home. Please see the admitting history and physical for further details. She was found to have fractured hip and was admitted to the hospital for further evaluation and treatment. She has been enrolled with [facility] for over a year due to her ovarian cancer. She was seen by surgery who felt surgical repair was indicated. She underwent the above-mentioned surgical procedure and tolerated it without difficulty. Postoperatively she has done relatively well. She has had a moderate postoperative blood loss anemia. At time of this dictation a repeat hemoglobin this morning is pending. If hemoglobin below 7 we will likely transfuse prior to her discharge this afternoon. Her pain is better controlled with Tylenol. Develop some confusion with morphine. She is on Decadron chronically at home he will be on Lovenox for DVT prophylaxis. Will add Pepcid for gut prophylaxis. She will follow-up with orthopedic surgery per their recommendations and plans are to go to [facility] today for further rehabilitative care.        Physical Exam:  Temp:  [97 °F (36.1 °C)-98.5 °F (36.9 °C)] 97.6 °F (36.4 °C)  Heart Rate:  [86-94] 86  Resp:  [18] 18  BP: (104-143)/(53-73) 117/53  Body mass index is 16.83 kg/m².  Physical Exam  Constitutional: She is oriented to person, place, and time. She appears cachectic. No distress.   Cardiovascular: Normal rate. An irregularly irregular rhythm present. No murmur heard.  Pulmonary/Chest: Effort normal and breath sounds normal.   Abdominal: Soft. Bowel sounds are normal. There is no tenderness.   Neurological: She is alert and oriented to person, place, and  time.   Skin: Skin is warm and dry. She is not diaphoretic.       Condition on Discharge:  Stable.    Discharge Disposition:   [facility]    Allergies:   Allergies   Allergen Reactions   • Penicillins Hives   • Codeine    • Sulfa Antibiotics        Discharge Medications:      Discharge Medications      New Medications      Instructions Start Date   enoxaparin 30 MG/0.3ML solution syringe  Commonly known as:  LOVENOX   30 mg, Subcutaneous, Every 24 Hours      famotidine 20 MG tablet  Commonly known as:  PEPCID   20 mg, Oral, Daily         Changes to Medications      Instructions Start Date   acetaminophen 325 MG tablet  Commonly known as:  TYLENOL  What changed:    · medication strength  · how much to take  · when to take this   650 mg, Oral, Every 4 Hours PRN         Continue These Medications      Instructions Start Date   BISACODYL LAXATIVE RE   10 mg, Rectal, Daily      dexamethasone 6 MG tablet  Commonly known as:  DECADRON   6 mg, Oral, Daily With Breakfast      hydrocortisone 1 % cream   1 application, Topical, Take As Directed      levothyroxine 125 MCG tablet  Commonly known as:  SYNTHROID   125 mcg, Oral, Daily      Lidocaine 2 % gel   1 application, Apply externally, 4 Times Daily PRN      Loratadine 10 MG capsule   10 mg, Oral, Daily      ondansetron 8 MG tablet  Commonly known as:  ZOFRAN   Oral, Every 6 Hours PRN      sertraline 25 MG tablet  Commonly known as:  ZOLOFT   TAKE 1 TABLET DAILY      SOOTHE & COOL INZO BARRIER EX   1 application, Apply externally, As Needed      TGT SENNA LAX/STOOL SOFTENER 8.6-50 MG per tablet  Generic drug:  senna-docusate   2 tablets, Oral, 2 Times Daily         Stop These Medications    morphine 100 MG/5ML solution concentrated solution            Discharge Diet:   Diet Instructions     Diet: Regular      Discharge Diet:  Regular          Activity at Discharge:   Activity Instructions     Activity as Tolerated        WBAT    Follow-up Appointments:  No  future appointments.   Contact information for follow-up providers     Jesus Velázquez Jr., MD Follow up in 2 week(s).    Specialty:  Internal Medicine  Contact information:  [address]  [facility] [id number] [phone]             Prabhjot Henriquez MD .    Specialty:  Hematology and Oncology  Contact information:  [address] [facility] [address]  [facility] [address]  [phone]             Kamar Oh II, MD Follow up.    Specialty:  Orthopedic Surgery  Contact information:  [address]  [facility] [address]  [phone]                   Contact information for after-discharge care     Destination     [facility] Follow up.    Service:  Skilled Nursing  Contact information:  [address] [facility] [address] [phone]                               Test Results Pending at Discharge:   Order Current Status    CBC (No Diff) Collected (11/17/18 1037)           Code status:   Code Status and Medical Interventions:   Ordered at: 11/13/18 2060     Level Of Support Discussed With:    Patient     Code Status:    No CPR     Medical Interventions (Level of Support Prior to Arrest):    Comfort Measures         Louis Simmons MD  [facility]  11/17/18  10:40 AM      Time: greater than 30 minutes.

## 2018-11-17 NOTE — PLAN OF CARE
Problem: Patient Care Overview  Goal: Plan of Care Review  Outcome: Ongoing (interventions implemented as appropriate)   11/17/18 0251   Coping/Psychosocial   Plan of Care Reviewed With patient   Plan of Care Review   Progress improving   OTHER   Outcome Summary Pt pain controlled with PO pain meds. VSS. Voiding per brief. Refused stool softener r/t multiple bm through shift. Q2 turn. Plan to dc to [place] ambulance scheduled. Family at bedside. Will cont to monitor.      Goal: Individualization and Mutuality  Outcome: Ongoing (interventions implemented as appropriate)    Goal: Discharge Needs Assessment  Outcome: Ongoing (interventions implemented as appropriate)      Problem: Fall Risk (Adult)  Goal: Absence of Fall  Outcome: Ongoing (interventions implemented as appropriate)

## (undated) DEVICE — VIOLET BRAIDED (POLYGLACTIN 910), SYNTHETIC ABSORBABLE SUTURE: Brand: COATED VICRYL

## (undated) DEVICE — NDL SPINE 20G 3 1/2 YEL STRL 1P/U

## (undated) DEVICE — ENCORE® LATEX ORTHO SIZE 8.5, STERILE LATEX POWDER-FREE SURGICAL GLOVE: Brand: ENCORE

## (undated) DEVICE — GOWN,PREVENTION PLUS,XLONG/XLARGE,STRL: Brand: MEDLINE

## (undated) DEVICE — SYR LUERLOK 20CC

## (undated) DEVICE — GLV SURG SENSICARE GREEN W/ALOE PF LF 8.5 STRL

## (undated) DEVICE — SPNG GZ WOVN 4X4IN 12PLY 10/BX STRL

## (undated) DEVICE — DRP C/ARMOR

## (undated) DEVICE — 4.0MM LONG AO PILOT DRILL: Brand: TRIGEN

## (undated) DEVICE — DRSNG SURESITE WNDW 4X4.5

## (undated) DEVICE — PK HIP PINNING 40

## (undated) DEVICE — WEBRIL* CAST PADDING: Brand: DEROYAL

## (undated) DEVICE — MAT FLR ABSORBENT LG 4FT 10 2.5FT

## (undated) DEVICE — STPLR SKIN VISISTAT WD 35CT

## (undated) DEVICE — APPL CHLORAPREP W/TINT 26ML ORNG

## (undated) DEVICE — GUIDE PIN 3.2MM X 343MM: Brand: TRIGEN

## (undated) DEVICE — SOL ISO/ALC RUB 70PCT 4OZ

## (undated) DEVICE — BNDG ELAS CO-FLEX SLF ADHR 6IN 5YD LF STRL